# Patient Record
Sex: MALE | Race: OTHER | NOT HISPANIC OR LATINO | URBAN - METROPOLITAN AREA
[De-identification: names, ages, dates, MRNs, and addresses within clinical notes are randomized per-mention and may not be internally consistent; named-entity substitution may affect disease eponyms.]

---

## 2019-07-26 ENCOUNTER — INPATIENT (INPATIENT)
Facility: HOSPITAL | Age: 44
LOS: 6 days | Discharge: HOME HEALTH | DRG: 231 | End: 2019-08-02
Attending: THORACIC SURGERY (CARDIOTHORACIC VASCULAR SURGERY) | Admitting: THORACIC SURGERY (CARDIOTHORACIC VASCULAR SURGERY)
Payer: SELF-PAY

## 2019-07-26 VITALS
HEIGHT: 66 IN | SYSTOLIC BLOOD PRESSURE: 135 MMHG | DIASTOLIC BLOOD PRESSURE: 87 MMHG | WEIGHT: 242.07 LBS | TEMPERATURE: 98 F | RESPIRATION RATE: 20 BRPM | HEART RATE: 60 BPM | OXYGEN SATURATION: 100 %

## 2019-07-26 DIAGNOSIS — R63.8 OTHER SYMPTOMS AND SIGNS CONCERNING FOOD AND FLUID INTAKE: ICD-10-CM

## 2019-07-26 DIAGNOSIS — I21.3 ST ELEVATION (STEMI) MYOCARDIAL INFARCTION OF UNSPECIFIED SITE: ICD-10-CM

## 2019-07-26 DIAGNOSIS — Z29.9 ENCOUNTER FOR PROPHYLACTIC MEASURES, UNSPECIFIED: ICD-10-CM

## 2019-07-26 PROBLEM — Z00.00 ENCOUNTER FOR PREVENTIVE HEALTH EXAMINATION: Status: ACTIVE | Noted: 2019-07-26

## 2019-07-26 LAB
ANION GAP SERPL CALC-SCNC: 12 MMOL/L — SIGNIFICANT CHANGE UP (ref 5–17)
APPEARANCE UR: CLEAR — SIGNIFICANT CHANGE UP
APTT BLD: 108 SEC — HIGH (ref 27.5–36.3)
APTT BLD: 28.2 SEC — SIGNIFICANT CHANGE UP (ref 27.5–36.3)
BILIRUB UR-MCNC: NEGATIVE — SIGNIFICANT CHANGE UP
BLD GP AB SCN SERPL QL: NEGATIVE — SIGNIFICANT CHANGE UP
BUN SERPL-MCNC: 10 MG/DL — SIGNIFICANT CHANGE UP (ref 7–23)
CALCIUM SERPL-MCNC: 9.2 MG/DL — SIGNIFICANT CHANGE UP (ref 8.4–10.5)
CHLORIDE SERPL-SCNC: 102 MMOL/L — SIGNIFICANT CHANGE UP (ref 96–108)
CHOLEST SERPL-MCNC: 264 MG/DL — HIGH (ref 10–199)
CK MB CFR SERPL CALC: 255.2 NG/ML — HIGH (ref 0–6.7)
CK MB CFR SERPL CALC: 279 NG/ML — HIGH (ref 0–6.7)
CK SERPL-CCNC: 3218 U/L — HIGH (ref 30–200)
CK SERPL-CCNC: 3734 U/L — HIGH (ref 30–200)
CO2 SERPL-SCNC: 25 MMOL/L — SIGNIFICANT CHANGE UP (ref 22–31)
COLOR SPEC: YELLOW — SIGNIFICANT CHANGE UP
CREAT SERPL-MCNC: 0.91 MG/DL — SIGNIFICANT CHANGE UP (ref 0.5–1.3)
DIFF PNL FLD: ABNORMAL
GLUCOSE SERPL-MCNC: 112 MG/DL — HIGH (ref 70–99)
GLUCOSE UR QL: NEGATIVE — SIGNIFICANT CHANGE UP
HBA1C BLD-MCNC: 5.6 % — SIGNIFICANT CHANGE UP (ref 4–5.6)
HCT VFR BLD CALC: 43.8 % — SIGNIFICANT CHANGE UP (ref 39–50)
HDLC SERPL-MCNC: 43 MG/DL — SIGNIFICANT CHANGE UP
HGB BLD-MCNC: 14.6 G/DL — SIGNIFICANT CHANGE UP (ref 13–17)
INR BLD: 1.08 — SIGNIFICANT CHANGE UP (ref 0.88–1.16)
KETONES UR-MCNC: NEGATIVE — SIGNIFICANT CHANGE UP
LEUKOCYTE ESTERASE UR-ACNC: NEGATIVE — SIGNIFICANT CHANGE UP
LIPID PNL WITH DIRECT LDL SERPL: 199 MG/DL — HIGH
MCHC RBC-ENTMCNC: 29.5 PG — SIGNIFICANT CHANGE UP (ref 27–34)
MCHC RBC-ENTMCNC: 33.3 GM/DL — SIGNIFICANT CHANGE UP (ref 32–36)
MCV RBC AUTO: 88.5 FL — SIGNIFICANT CHANGE UP (ref 80–100)
NITRITE UR-MCNC: NEGATIVE — SIGNIFICANT CHANGE UP
NRBC # BLD: 0 /100 WBCS — SIGNIFICANT CHANGE UP (ref 0–0)
PH UR: 7 — SIGNIFICANT CHANGE UP (ref 5–8)
PLATELET # BLD AUTO: 207 K/UL — SIGNIFICANT CHANGE UP (ref 150–400)
POTASSIUM SERPL-MCNC: 3.9 MMOL/L — SIGNIFICANT CHANGE UP (ref 3.5–5.3)
POTASSIUM SERPL-SCNC: 3.9 MMOL/L — SIGNIFICANT CHANGE UP (ref 3.5–5.3)
PROT UR-MCNC: NEGATIVE MG/DL — SIGNIFICANT CHANGE UP
PROTHROM AB SERPL-ACNC: 12.2 SEC — SIGNIFICANT CHANGE UP (ref 10–12.9)
RBC # BLD: 4.95 M/UL — SIGNIFICANT CHANGE UP (ref 4.2–5.8)
RBC # FLD: 11.3 % — SIGNIFICANT CHANGE UP (ref 10.3–14.5)
RH IG SCN BLD-IMP: POSITIVE — SIGNIFICANT CHANGE UP
SODIUM SERPL-SCNC: 139 MMOL/L — SIGNIFICANT CHANGE UP (ref 135–145)
SP GR SPEC: 1.01 — SIGNIFICANT CHANGE UP (ref 1–1.03)
TOTAL CHOLESTEROL/HDL RATIO MEASUREMENT: 6.1 RATIO — SIGNIFICANT CHANGE UP (ref 3.4–9.6)
TRIGL SERPL-MCNC: 110 MG/DL — SIGNIFICANT CHANGE UP (ref 10–149)
TROPONIN T SERPL-MCNC: 2.4 NG/ML — CRITICAL HIGH (ref 0–0.01)
TROPONIN T SERPL-MCNC: 6.31 NG/ML — CRITICAL HIGH (ref 0–0.01)
TSH SERPL-MCNC: 1.2 UIU/ML — SIGNIFICANT CHANGE UP (ref 0.35–4.94)
UROBILINOGEN FLD QL: 0.2 E.U./DL — SIGNIFICANT CHANGE UP
WBC # BLD: 12.27 K/UL — HIGH (ref 3.8–10.5)
WBC # FLD AUTO: 12.27 K/UL — HIGH (ref 3.8–10.5)

## 2019-07-26 PROCEDURE — 99291 CRITICAL CARE FIRST HOUR: CPT

## 2019-07-26 PROCEDURE — 93306 TTE W/DOPPLER COMPLETE: CPT | Mod: 26

## 2019-07-26 PROCEDURE — 99292 CRITICAL CARE ADDL 30 MIN: CPT

## 2019-07-26 PROCEDURE — 93010 ELECTROCARDIOGRAM REPORT: CPT

## 2019-07-26 PROCEDURE — 93458 L HRT ARTERY/VENTRICLE ANGIO: CPT | Mod: 26,59

## 2019-07-26 PROCEDURE — 99223 1ST HOSP IP/OBS HIGH 75: CPT | Mod: 57

## 2019-07-26 PROCEDURE — 92928 PRQ TCAT PLMT NTRAC ST 1 LES: CPT | Mod: RC

## 2019-07-26 PROCEDURE — 71045 X-RAY EXAM CHEST 1 VIEW: CPT | Mod: 26

## 2019-07-26 RX ORDER — TICAGRELOR 90 MG/1
180 TABLET ORAL ONCE
Refills: 0 | Status: COMPLETED | OUTPATIENT
Start: 2019-07-26 | End: 2019-07-26

## 2019-07-26 RX ORDER — HEPARIN SODIUM 5000 [USP'U]/ML
INJECTION INTRAVENOUS; SUBCUTANEOUS
Qty: 25000 | Refills: 0 | Status: DISCONTINUED | OUTPATIENT
Start: 2019-07-26 | End: 2019-07-26

## 2019-07-26 RX ORDER — TICAGRELOR 90 MG/1
90 TABLET ORAL EVERY 12 HOURS
Refills: 0 | Status: DISCONTINUED | OUTPATIENT
Start: 2019-07-26 | End: 2019-07-26

## 2019-07-26 RX ORDER — SODIUM CHLORIDE 9 MG/ML
1000 INJECTION INTRAMUSCULAR; INTRAVENOUS; SUBCUTANEOUS ONCE
Refills: 0 | Status: COMPLETED | OUTPATIENT
Start: 2019-07-26 | End: 2019-07-26

## 2019-07-26 RX ORDER — METOPROLOL TARTRATE 50 MG
12.5 TABLET ORAL EVERY 12 HOURS
Refills: 0 | Status: DISCONTINUED | OUTPATIENT
Start: 2019-07-26 | End: 2019-07-30

## 2019-07-26 RX ORDER — ASPIRIN/CALCIUM CARB/MAGNESIUM 324 MG
81 TABLET ORAL EVERY 24 HOURS
Refills: 0 | Status: DISCONTINUED | OUTPATIENT
Start: 2019-07-27 | End: 2019-07-30

## 2019-07-26 RX ORDER — NITROGLYCERIN 6.5 MG
0.4 CAPSULE, EXTENDED RELEASE ORAL ONCE
Refills: 0 | Status: COMPLETED | OUTPATIENT
Start: 2019-07-26 | End: 2019-07-26

## 2019-07-26 RX ORDER — HEPARIN SODIUM 5000 [USP'U]/ML
1000 INJECTION INTRAVENOUS; SUBCUTANEOUS
Qty: 25000 | Refills: 0 | Status: DISCONTINUED | OUTPATIENT
Start: 2019-07-26 | End: 2019-07-27

## 2019-07-26 RX ORDER — CLOPIDOGREL BISULFATE 75 MG/1
75 TABLET, FILM COATED ORAL DAILY
Refills: 0 | Status: DISCONTINUED | OUTPATIENT
Start: 2019-07-27 | End: 2019-07-28

## 2019-07-26 RX ORDER — ATORVASTATIN CALCIUM 80 MG/1
80 TABLET, FILM COATED ORAL AT BEDTIME
Refills: 0 | Status: DISCONTINUED | OUTPATIENT
Start: 2019-07-26 | End: 2019-07-30

## 2019-07-26 RX ORDER — HEPARIN SODIUM 5000 [USP'U]/ML
4000 INJECTION INTRAVENOUS; SUBCUTANEOUS ONCE
Refills: 0 | Status: COMPLETED | OUTPATIENT
Start: 2019-07-26 | End: 2019-07-26

## 2019-07-26 RX ORDER — HEPARIN SODIUM 5000 [USP'U]/ML
4000 INJECTION INTRAVENOUS; SUBCUTANEOUS EVERY 6 HOURS
Refills: 0 | Status: DISCONTINUED | OUTPATIENT
Start: 2019-07-26 | End: 2019-07-26

## 2019-07-26 RX ORDER — CHLORHEXIDINE GLUCONATE 213 G/1000ML
1 SOLUTION TOPICAL
Refills: 0 | Status: DISCONTINUED | OUTPATIENT
Start: 2019-07-26 | End: 2019-07-30

## 2019-07-26 RX ADMIN — Medication 12.5 MILLIGRAM(S): at 22:01

## 2019-07-26 RX ADMIN — ATORVASTATIN CALCIUM 80 MILLIGRAM(S): 80 TABLET, FILM COATED ORAL at 22:01

## 2019-07-26 RX ADMIN — HEPARIN SODIUM 10 UNIT(S)/HR: 5000 INJECTION INTRAVENOUS; SUBCUTANEOUS at 20:21

## 2019-07-26 RX ADMIN — Medication 0.4 MILLIGRAM(S): at 09:53

## 2019-07-26 RX ADMIN — CHLORHEXIDINE GLUCONATE 1 APPLICATION(S): 213 SOLUTION TOPICAL at 18:07

## 2019-07-26 RX ADMIN — SODIUM CHLORIDE 1000 MILLILITER(S): 9 INJECTION INTRAMUSCULAR; INTRAVENOUS; SUBCUTANEOUS at 09:49

## 2019-07-26 RX ADMIN — TICAGRELOR 180 MILLIGRAM(S): 90 TABLET ORAL at 09:47

## 2019-07-26 RX ADMIN — HEPARIN SODIUM 4000 UNIT(S): 5000 INJECTION INTRAVENOUS; SUBCUTANEOUS at 09:53

## 2019-07-26 RX ADMIN — Medication 12.5 MILLIGRAM(S): at 12:57

## 2019-07-26 RX ADMIN — CHLORHEXIDINE GLUCONATE 1 APPLICATION(S): 213 SOLUTION TOPICAL at 10:37

## 2019-07-26 NOTE — ED PROVIDER NOTE - OBJECTIVE STATEMENT
Pt is a 43yo m, no known pmh, who p/w generalized chest tightness starting 8:50a today, a/w diaphoresis and sweating. Pt is a 43yo m, no known pmh, who p/w generalized chest tightness starting 8:50a today, a/w diaphoresis and sweating. Sx's worse with walking. No sob, palp, dizziness, syncope, radiation. Father w/ mi in his late 40's. Denies smoking. No recreational drug use.

## 2019-07-26 NOTE — PROGRESS NOTE ADULT - SUBJECTIVE AND OBJECTIVE BOX
PROCEDURE: CORONARY ANGIOGRAM, LVGRAM, LHC, PCI   INDICATION: STEMI  ATTENDING: DEON   ACCESS: RRA  D2B: 51 minutes    FINDINGS:   dLM= 70%  pLcx=60%   pLAD=95% at bifurcation with D1  D1= 95% ostial (Riley 1,1,1)  mLAD= 90%   mRCA= 100% (culprit)    LVEF=45% with inferior hypokinesis  LVEDP=33mmHg    PROCEDURE:   PCI of mRCA with JESSICA x1 (sarah 5.0 x22 post dilated to 6.0) with excellent angiographic result and restoration of CRISTIANA 3 flow    A/P  -CCU admit  -plavix 75 mg in am   -aspirin 81 mg daily   -echo  -CT surgery eval (Dr. Cooper for evaluation of CABG in the setting of severe LM, LAD and D1 disease)

## 2019-07-26 NOTE — ED PROVIDER NOTE - PHYSICAL EXAMINATION
VITAL SIGNS: I have reviewed nursing notes and confirm.  CONSTITUTIONAL: Well-developed; well-nourished; in no acute distress. Mildly diaphoresis.  SKIN:  warm and dry, no acute rash.   HEAD:  normocephalic, atraumatic.  EYES: EOM intact; conjunctiva and sclera clear.  ENT: No nasal discharge; airway clear.   NECK: Supple; non tender.  CARD: S1, S2 normal; no murmurs, gallops, or rubs. Regular rate and rhythm.   RESP:  Clear to auscultation b/l, no wheezes, rales or rhonchi.  ABD: Normal bowel sounds; soft; non-distended; non-tender; no guarding/ rebound.  EXT: Normal ROM. No clubbing, cyanosis or edema. 2+ pulses to b/l ue/le.  NEURO: Alert, oriented, grossly unremarkable  PSYCH: Cooperative, mood and affect appropriate.

## 2019-07-26 NOTE — CONSULT NOTE ADULT - SUBJECTIVE AND OBJECTIVE BOX
Surgeon: Dr. Cooper    Requesting Physician: Dr. Nathan Zarate    HISTORY OF PRESENT ILLNESS (Need 4):  44y Male    PAST MEDICAL & SURGICAL HISTORY:      MEDICATIONS  (STANDING):  atorvastatin 80 milliGRAM(s) Oral at bedtime  chlorhexidine 2% Cloths 1 Application(s) Topical <User Schedule>    Allergies:  No Known Allergies    SOCIAL HISTORY:  Smoker:  YES / NO        PACK YEARS:                         WHEN QUIT?  ETOH use:  YES / NO               FREQUENCY / QUANTITY:  Ilicit Drug use:  YES / NO  Occupation:  Assisted device use (Cane / Walker):  Live with:    FAMILY HISTORY:    Review of Systems:  CONSTITUTIONAL: Denies fevers / chills, sweats, fatigue, weight loss, weight gain                                       NEURO:  Denies parathesias, seizures, syncope, confusion                                                                                  EYES:  Denies blurry vision, discharge, pain, loss of vision                                                                                    ENMT:  Denies difficulty hearing, vertigo, dysphagia, epistaxis, recent dental work                                       CV:  Denies chest pain, palpitations, ELIAS, orthopnea                                                                                           RESPIRATORY:  Denies wWheezing, SOB, cough / sputum, hemoptysis                                                               GI:  Denies nausea, vomiting, diarrhea, constipation, melena                                                                          : Denies hematuria, dysuria, urgency, incontinence                                                                                          MUSKULOSKELETAL:  Denies arthritis, joint swelling, muscle weakness                                                             SKIN/BREAST:  Denies rash, itching, hair loss, masses                                                                                              PSYCH:  Denies depression, anxiety, suicidal ideation                                                                                                HEME/LYMPH:  Denies bruises easily, enlarged lymph nodes, tender lymph nodes                                          ENDOCRINE:  Denies cold intolerance, heat intolerance, polydipsia                                                                      Vital Signs Last 24 Hrs  T(C): 36.6 (26 Jul 2019 09:34), Max: 36.6 (26 Jul 2019 09:34)  T(F): 97.9 (26 Jul 2019 09:34), Max: 97.9 (26 Jul 2019 09:34)  HR: 73 (26 Jul 2019 09:53) (60 - 73)  BP: 136/99 (26 Jul 2019 09:53) (135/87 - 136/99)  BP(mean): --  RR: 22 (26 Jul 2019 09:53) (20 - 22)  SpO2: 95% (26 Jul 2019 09:53) (95% - 100%)    Physical Exam   CONSTITUTIONAL:                                                      NEURO:                                                                                                 EYES:                                                                        ENMT:                                                                                CV:                                                                                   RESPIRATORY:                                                                GI:                                                                                   :                                                    MUSKULOSKELETAL:                                                       SKIN / BREAST:                                                                                                     LABS:                        14.6   9.64  )-----------( 240      ( 26 Jul 2019 09:52 )             43.4     07-26  142  |  105  |  10  ----------------------------<  151  4.0   |  27  |  0.96    Ca    9.1      26 Jul 2019 09:52    TPro  7.2  /  Alb  4.5  /  TBili  0.3  /  DBili  x   /  AST  21  /  ALT  25  /  AlkPhos  91  07-26    PT/INR - ( 26 Jul 2019 09:52 )   PT: 11.3 sec;   INR: 1.00     PTT - ( 26 Jul 2019 09:52 )  PTT:29.0 sec    CARDIAC MARKERS ( 26 Jul 2019 09:52 )  x     / <0.01 ng/mL / 255 U/L / x     / 4.5 ng/mL    RADIOLOGY & ADDITIONAL STUDIES:  CAROTID U/S:    CXR:    CT Scan:    EKG:    TTE / REGIS:    Cardiac Cath: Surgeon: Dr. Cooper    Requesting Physician: Dr. Nathan Zarate    HISTORY OF PRESENT ILLNESS:  44y Male with no significant PMHx who presented to the ED this AM with 2 day history of CP and tightness.  He endorses that he was at work this AM and had an acute worsening in the chest pressure.  He became diaphoretic and clammy.  The chest pressure was so intense that he vomited.  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  In the ED, he was found to have EKG changes in the inferior leads with reciprocal changes in the anterior and lateral leads.  He was loaded with ASA/brillinta and sent to cath labs.   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  CT surgery is consulted for surgical evaluation.    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  Assisted device use (Cane / Walker): None  Live with: Parents    FAMILY HISTORY:  Father:  MI in 50's requiring CABG/PPM    Review of Systems:  CONSTITUTIONAL: + sweats Denies fevers / chills, fatigue, weight loss, weight gain                                       NEURO:  Denies parathesias, seizures, syncope, confusion                                                                                  EYES:  Denies blurry vision, discharge, pain, loss of vision                                                                                    ENMT:  Denies difficulty hearing, vertigo, dysphagia, epistaxis, recent dental work                                       CV: + chest pressure Denies  palpitations, ELIAS, orthopnea                                                                                           RESPIRATORY:  Denies wheezing, SOB, cough / sputum, hemoptysis                                                               GI:  + vomiting Denies nausea, diarrhea, constipation, melena                                                                          : Denies hematuria, dysuria, urgency, incontinence                                                                                          MUSKULOSKELETAL:  Denies arthritis, joint swelling, muscle weakness                                                             SKIN/BREAST:  Denies rash, itching, hair loss, masses                                                                                              PSYCH:  Denies depression, anxiety, suicidal ideation                                                                                                HEME/LYMPH:  Denies bruises easily, enlarged lymph nodes, tender lymph nodes                                          ENDOCRINE:  Denies cold intolerance, heat intolerance, polydipsia                                                                      Vital Signs Last 24 Hrs  T(C): 36.6 (26 Jul 2019 09:34), Max: 36.6 (26 Jul 2019 09:34)  T(F): 97.9 (26 Jul 2019 09:34), Max: 97.9 (26 Jul 2019 09:34)  HR: 73 (26 Jul 2019 09:53) (60 - 73)  BP: 136/99 (26 Jul 2019 09:53) (135/87 - 136/99)  BP(mean): --  RR: 22 (26 Jul 2019 09:53) (20 - 22)  SpO2: 95% (26 Jul 2019 09:53) (95% - 100%)    Physical Exam   CONSTITUTIONAL:  WDWN in NAD                                               NEURO: AA&Ox3, no focal deficits                                                                                           EYES: PERRL                                                                        ENMT: supple, no JVD                                                                       CV:  S1S2, no murmurs appreciated                                                        RESPIRATORY: b/l breath sounds                                 GI: soft, NTND +NABS                                       :  WNL                         MUSKULOSKELETAL: moves all 4  VASCULAR:  WWP, rigth radial with band, left radial 2+, b/l LE DP/PT 2+                                                     SKIN / BREAST:  WNL                                                          LABS:                        14.6   9.64  )-----------( 240      ( 26 Jul 2019 09:52 )             43.4     07-26  142  |  105  |  10  ----------------------------<  151  4.0   |  27  |  0.96    Ca    9.1      26 Jul 2019 09:52    TPro  7.2  /  Alb  4.5  /  TBili  0.3  /  DBili  x   /  AST  21  /  ALT  25  /  AlkPhos  91  07-26    PT/INR - ( 26 Jul 2019 09:52 )   PT: 11.3 sec;   INR: 1.00     PTT - ( 26 Jul 2019 09:52 )  PTT:29.0 sec    CARDIAC MARKERS ( 26 Jul 2019 09:52 )  x     / <0.01 ng/mL / 255 U/L / x     / 4.5 ng/mL    RADIOLOGY & ADDITIONAL STUDIES:  CAROTID U/S: pending    CXR: pending    EKG: in chart    TTE / REGIS: pending    Cardiac Cath:  FINDINGS:   dLM= 70%  pLcx=60%   pLAD=95% at bifurcation with D1  D1= 95% ostial  mLAD= 90%   mRCA= 100% (culprit)    LVEF=45% with inferior hypokinesis  LVEDP=33mmHg    PROCEDURE:   PCI of mRCA with JESSICA x1 (sarah 5.0 x22 post dilated to 6.0) with excellent angiographic result and restoration of CRISTIANA 3 flow

## 2019-07-26 NOTE — CONSULT NOTE ADULT - ASSESSMENT
44y Male with no significant PMHx who presented to the ED this AM with 2 day history of CP and tightness.  He endorses that he was at work this AM and had an acute worsening in the chest pressure.  He became diaphoretic and clammy.  The chest pressure was so intense that he vomited.  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  In the ED, he was found to have EKG changes in the inferior leads with reciprocal changes in the anterior and lateral leads.  He was loaded with ASA/brillinta and sent to cath labs.   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  CT surgery is consulted for surgical evaluation.    Neuro: no pain at this time  - if chest pain to return, consider IABP or surgical intervention    CV:  3VCAD  -  s/p cardiac cath with JESSICA to mRCA (ASA/brillinta load)  -  cont. with ASA/plavix  -  no beta blocker in light of HR in low 60's  -  statin:  atorvastatin   -  pre-op patient for OR on Monday: ECHO, carotid US, CXR, type/screen x2, place blood on hold, f/u pre op labs    Pulm: 98% on RA  -  obtain PFTs  -  encourage IS 10/hr while awake  -  obtain room air ABG once radial band off on right arm    GI: DASH diet  -  start Ppx with pantoprazole  -  NPO on Sunday for OR Monday    Renal:  -  strict I/O's   -  monitor BUN/Crt. in light of dye load    Heme:  -  HSQ q8    Endo:  -  f/u TSH and HgA1C    Dispo:   -  OR on Monday with Dr. Cooper 44y Male with no significant PMHx who presented to the ED this AM with 2 day history of CP and tightness.  He endorses that he was at work this AM and had an acute worsening in the chest pressure.  He became diaphoretic and clammy.  The chest pressure was so intense that he vomited.  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  In the ED, he was found to have EKG changes in the inferior leads with reciprocal changes in the anterior and lateral leads.  He was loaded with ASA/brillinta and sent to cath labs.   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  CT surgery is consulted for surgical evaluation.    Neuro: no pain at this time  - if chest pain to return, consider IABP or surgical intervention    CV:  3VCAD  -  s/p cardiac cath with JESSICA to mRCA (ASA/brillinta load)  -  cont. with ASA/plavix  -  please start beta blocker on patient  -  statin:  atorvastatin   -  pre-op patient for OR on Monday: ECHO, carotid US, CXR, type/screen x2, place blood on hold, f/u pre op labs    Pulm: 98% on RA  -  obtain PFTs  -  encourage IS 10/hr while awake  -  obtain room air ABG once radial band off on right arm    GI: DASH diet  -  start Ppx with pantoprazole  -  NPO on Sunday for OR Monday    Renal:  -  strict I/O's   -  monitor BUN/Crt. in light of dye load    Heme:  -  HSQ q8    Endo:  -  f/u TSH and HgA1C    Dispo:   -  OR on Monday with Dr. Cooper

## 2019-07-26 NOTE — H&P ADULT - ASSESSMENT
43yo M with no PMHx (Fhx of father with ACS s/p CABG age ~44yo) presented to the ED with crushing chest pain and left arm pain. Found to have inferior wall STEMI s/p JESSICA to mid-RCA, now pending CABG on Monday for left main disease. 45yo M with no PMHx (Fhx of father with ACS s/p CABG age ~46yo) presented to the ED with crushing chest pain and left arm pain. Found to have inferior/posterior wall STEMI s/p JESSICA to mid-RCA, now pending CABG on Monday for left main disease.

## 2019-07-26 NOTE — H&P ADULT - HISTORY OF PRESENT ILLNESS
This is a     On ED arrival, his vitals were: Temp: 97.9, HR:60, BP: 135/87, RR:20 SpO2 100% on RA. His troponins and CKMB were negative and CK was mildly elevated to 255. His EKG significant for inferior wall STEMI with reciprocal changes in lateral and anterior leads.     He was aspirin, Brilinta loaded at 9: 45 AM in the ED, given nitroglycerin x1, heparin bolused and started on hep gtt and sent to cath lab. 45yo M with no PMHx (Fhx of father with ACS s/p CABG age ~46yo) presented to the ED with crushing chest pain and left arm pain after co-worker (a pharmacist) found him to be diaphoretic and pale. Patient reports first feeling the chest tightness on Wednesday. However, today on the way to work he reports the pain being worse w/ associated left arm pain and [place]aph[place] work and his co-worker gave him 4x*81mg ASA and told him to go to the ED.     On ED arrival, his vitals were: Temp: 97.9, HR:60, BP: 135/87, RR:20 SpO2 100% on RA. His troponins and CKMB were negative and CK was mildly elevated to 255. His EKG significant for inferior wall STEMI with reciprocal changes in lateral and anterior leads.     He was aspirin, Brilinta loaded at 9: 45 AM in the ED, given nitroglycerin x1, heparin bolused and started on hep gtt and sent to cath lab. He received a JESSICA in the mid-RCA and also found to have left main and LAD lesions. 45yo M with no PMHx (Fhx of father with ACS s/p CABG age ~46yo) presented to the ED with crushing chest pain and left arm pain after co-worker (a pharmacist) found him to be diaphoretic and pale. Patient reports first feeling the chest tightness on Wednesday. However, today on the way to work he reports the pain being worse w/ associated left arm pain and [place]aph[place] He [place] at work and his co-worker gave him 4x 81mg ASA and told him to go to the ED.     On ED arrival, his vitals were: Temp: 97.9, HR:60, BP: 135/87, RR:20 SpO2 100% on RA. His troponins and CKMB were negative and CK was mildly elevated to 255. His EKG significant for inferior wall STEMI with reciprocal changes in lateral and anterior leads.     He was aspirin, Brilinta loaded at 9: 45 AM in the ED, given nitroglycerin x1, heparin bolused and started on hep gtt and sent to cath lab. He received a JESSICA in the mid-RCA and also found to have left main and LAD lesions. 45yo M with no PMHx (Fhx of father with ACS s/p CABG age ~46yo) presented to the ED with crushing chest pain and left arm pain after co-worker (a pharmacist) found him to be diaphoretic and pale. Patient reports first feeling the chest tightness on Wednesday. However, today on the way to work he reports the pain being worse w/ associated left arm pain and [place] He [place] at work and his co-worker gave him 4x 81mg ASA and told him to go to the ED.     On ED arrival, his vitals were: Temp: 97.9, HR:60, BP: 135/87, RR:20 SpO2 100% on RA. His troponins and CKMB were negative and CK was mildly elevated to 255. His EKG significant for inferior/posterior wall STEMI with reciprocal changes in lateral and anterior leads.     He was aspirin, Brilinta loaded at 9: 45 AM in the ED, given nitroglycerin x1, heparin bolused and started on hep gtt and sent to cath lab. He received a JESSICA in the mid-RCA and also found to have left main and LAD lesions.

## 2019-07-26 NOTE — ED ADULT NURSE NOTE - OBJECTIVE STATEMENT
Pt presents to ED c/o CP. Pt with no PMH presents with 3 days of L sided CP, intermittent, most recently started 830 am when pt got to work, 7/10, was given 4x81 mg ASA by a coworker which did not help pain, pt came to ED. On arrival pt pale and diaphoretic, EKG preformed in triage, pt upgraded to MD Gardner and brought to bed 6. 2nd EKG preformed, cath lab code called by MD Gardner. Pt connected to continuous portable monitor, pt undressed and belongings and valuables checked by security. See eMAR for medications administered. Pt transported to cath lab with cards fellow accompanying and brought to cath lab room E, hand off given to cath lab RNs.

## 2019-07-26 NOTE — H&P ADULT - PROBLEM SELECTOR PLAN 3
DVT prophylaxis: Lovenox   Code status: Full code   Dispo: CCU DVT prophylaxis: heparin ggt  Code status: Full code   Dispo: CCU

## 2019-07-26 NOTE — H&P ADULT - ATTENDING COMMENTS
Pt is a 45 y/o man c strong family h/o CAD (father c CABG @ 45, uncle  of MI @ age 28) who p/w 3 day h/o stuttering chest pain that worsened today. Pt arrived at work nauseous, diaphoretic and light headed with SSCPressure.    ED ECG: sb @ 57 c 3mm WIL II, III, AVF, depressions V1-V2, I & AVL  Emergent Cath: 100% RCA now s/p PCI  dLM 90-95%, 99% trifurcation lesion LAD/D1/LCX  V-gram: LVEDP 35, EF ~ 30-35%    afebrile, 135/87, HR 75, 100% RA    Hgb 13.6  Plt 240  Cr 0.96  Glc 151  Joseph -ve on admission  INR 1    CXR: clr wnl    - pt with inf post stemi  - plan is for CABG on Monday for dLM and trifurcation lesion  - cont asa, pt loaded on brilinta  - switch to plavix in am and cont pt on hep  - pt will get CABG on plavix  - start lipitor 80mg  - would cont to observe for pulm edema as pt in acute sytolic CHF c LVEDP ~35, EF 35%  - start low dose B-blocker  - would hold ACE for now, would start immediately postoperatively  - check lipid panel, HgbA1C, and TTE    I have personally provided 70 minutes of critical care time concurrently with the resident/fellow and excludes time spent on separate procedures.   I have reviewed the resident's documentation and I agree with the resident's assessment and plan of care.

## 2019-07-26 NOTE — H&P ADULT - PROBLEM SELECTOR PLAN 1
- Pt is s/p aspirin and Brilinta load at 9:45 AM   - Continue aspirin 81mg daily starting tomorrow   - Continue Brilinta 90mg BID starting tonight   - Continue lipitor 80mg at bedtime  - follow up Lipid panel, TSH and HbA1c  - Official Echocardiogram Patient presented with chest pain found to have inferior wall MI. Now s/p JESSICA to mid-RCA. 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 Plan for CABG on monday.   - Pt is s/p aspirin and Brilinta load at 9:45 AM, instead of Brilinta will switch over to Plavix as he is going for CABG.   - Continue aspirin 81mg daily starting tomorrow   - Continue Plavix 75mg qd starting tomorrow  - Continue lipitor 80mg at bedtime  - Will start heparin ggt after radial band removed  - follow up Lipid panel, TSH and HbA1c  - Official Echocardiogram pending  - Continue to trend troponins Patient presented with chest pain found to have inferior wall MI. Now s/p JESSICA to mid-RCA. 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 Plan for CABG on monday.   - Pt is s/p aspirin and Brilinta load at 9:45 AM, instead of Brilinta will switch over to Plavix as he is going for CABG.   - Continue aspirin 81mg daily starting tomorrow   - Continue Plavix 75mg qd starting tomorrow  - Continue lipitor 80mg at bedtime  - Continue lopressor 12.5 BID  - Consider lisinopril should BPs allow  - Will start heparin ggt after radial band removed  - follow up Lipid panel, TSH and HbA1c  - Official Echocardiogram pending  - Continue to trend troponins

## 2019-07-26 NOTE — H&P ADULT - NSHPPHYSICALEXAM_GEN_ALL_CORE
VITAL SIGNS:  T(C): 36.6 (07-26-19 @ 09:34), Max: 36.6 (07-26-19 @ 09:34)  T(F): 97.9 (07-26-19 @ 09:34), Max: 97.9 (07-26-19 @ 09:34)  HR: 73 (07-26-19 @ 09:53) (60 - 73)  BP: 136/99 (07-26-19 @ 09:53) (135/87 - 136/99)  BP(mean): --  RR: 22 (07-26-19 @ 09:53) (20 - 22)  SpO2: 95% (07-26-19 @ 09:53) (95% - 100%)  Wt(kg): --    PHYSICAL EXAM:    Constitutional: WDWN resting comfortably in bed; NAD  Head: NC/AT  Eyes: PERRL, EOMI, anicteric sclera  ENT: no nasal discharge; uvula midline, no oropharyngeal erythema or exudates; MMM  Neck: supple; no JVD or thyromegaly  Respiratory: CTA B/L; no W/R/R, no retractions  Cardiac: +S1/S2; RRR; no M/R/G; PMI non-displaced  Gastrointestinal: abdomen soft, NT/ND; no rebound or guarding; +BSx4  Extremities: Right radial arm in place, WWP, no clubbing or cyanosis; no peripheral edema  Musculoskeletal: NROM x4; no joint swelling, tenderness or erythema  Vascular: 2+ radial, femoral, DP/PT pulses B/L  Dermatologic: skin warm, dry and intact;  Lymphatic: no LAD  Neurologic: AAOx3; CNII-XII grossly intact; no focal deficits  Psychiatric: affect and characteristics of appearance, verbalizations, behaviors are appropriate

## 2019-07-26 NOTE — ED PROVIDER NOTE - CLINICAL SUMMARY MEDICAL DECISION MAKING FREE TEXT BOX
Impression: acute inferior wall MI with ekg showing st elev to inferior wall w/ reciprocal depressions to lateral leads, v1, v2. HDS. STEMI code activated immediately after arrival. Pt given brilinta, ntg, and heparin bolus. Pt already took 4 baby asa pta. Pt seen by cards fellow; pt to be taken to cath lab now.

## 2019-07-26 NOTE — H&P ADULT - NSHPLABSRESULTS_GEN_ALL_CORE
.  LABS:                         14.6   9.64  )-----------( 240      ( 26 Jul 2019 09:52 )             43.4     07-26    142  |  105  |  10  ----------------------------<  151<H>  4.0   |  27  |  0.96    Ca    9.1      26 Jul 2019 09:52    TPro  7.2  /  Alb  4.5  /  TBili  0.3  /  DBili  x   /  AST  21  /  ALT  25  /  AlkPhos  91  07-26    PT/INR - ( 26 Jul 2019 09:52 )   PT: 11.3 sec;   INR: 1.00          PTT - ( 26 Jul 2019 09:52 )  PTT:29.0 sec    CARDIAC MARKERS ( 26 Jul 2019 09:52 )  x     / <0.01 ng/mL / 255 U/L / x     / 4.5 ng/mL            RADIOLOGY, EKG & ADDITIONAL TESTS: Reviewed.

## 2019-07-27 LAB
ANION GAP SERPL CALC-SCNC: 14 MMOL/L — SIGNIFICANT CHANGE UP (ref 5–17)
APTT BLD: 55.2 SEC — HIGH (ref 27.5–36.3)
APTT BLD: 56.6 SEC — HIGH (ref 27.5–36.3)
APTT BLD: 60.9 SEC — HIGH (ref 27.5–36.3)
BUN SERPL-MCNC: 12 MG/DL — SIGNIFICANT CHANGE UP (ref 7–23)
CALCIUM SERPL-MCNC: 9.5 MG/DL — SIGNIFICANT CHANGE UP (ref 8.4–10.5)
CHLORIDE SERPL-SCNC: 99 MMOL/L — SIGNIFICANT CHANGE UP (ref 96–108)
CK MB CFR SERPL CALC: 154.1 NG/ML — HIGH (ref 0–6.7)
CK MB CFR SERPL CALC: 225.4 NG/ML — HIGH (ref 0–6.7)
CK SERPL-CCNC: 2927 U/L — HIGH (ref 30–200)
CK SERPL-CCNC: 3712 U/L — HIGH (ref 30–200)
CO2 SERPL-SCNC: 24 MMOL/L — SIGNIFICANT CHANGE UP (ref 22–31)
CREAT SERPL-MCNC: 0.98 MG/DL — SIGNIFICANT CHANGE UP (ref 0.5–1.3)
GLUCOSE SERPL-MCNC: 128 MG/DL — HIGH (ref 70–99)
HCT VFR BLD CALC: 41.8 % — SIGNIFICANT CHANGE UP (ref 39–50)
HGB BLD-MCNC: 14.4 G/DL — SIGNIFICANT CHANGE UP (ref 13–17)
MAGNESIUM SERPL-MCNC: 1.9 MG/DL — SIGNIFICANT CHANGE UP (ref 1.6–2.6)
MCHC RBC-ENTMCNC: 30 PG — SIGNIFICANT CHANGE UP (ref 27–34)
MCHC RBC-ENTMCNC: 34.4 GM/DL — SIGNIFICANT CHANGE UP (ref 32–36)
MCV RBC AUTO: 87.1 FL — SIGNIFICANT CHANGE UP (ref 80–100)
NRBC # BLD: 0 /100 WBCS — SIGNIFICANT CHANGE UP (ref 0–0)
PLATELET # BLD AUTO: 207 K/UL — SIGNIFICANT CHANGE UP (ref 150–400)
POTASSIUM SERPL-MCNC: 3.8 MMOL/L — SIGNIFICANT CHANGE UP (ref 3.5–5.3)
POTASSIUM SERPL-SCNC: 3.8 MMOL/L — SIGNIFICANT CHANGE UP (ref 3.5–5.3)
RBC # BLD: 4.8 M/UL — SIGNIFICANT CHANGE UP (ref 4.2–5.8)
RBC # FLD: 11.5 % — SIGNIFICANT CHANGE UP (ref 10.3–14.5)
SODIUM SERPL-SCNC: 137 MMOL/L — SIGNIFICANT CHANGE UP (ref 135–145)
TROPONIN T SERPL-MCNC: 7.55 NG/ML — CRITICAL HIGH (ref 0–0.01)
TROPONIN T SERPL-MCNC: 8.22 NG/ML — CRITICAL HIGH (ref 0–0.01)
WBC # BLD: 11.91 K/UL — HIGH (ref 3.8–10.5)
WBC # FLD AUTO: 11.91 K/UL — HIGH (ref 3.8–10.5)

## 2019-07-27 PROCEDURE — 93880 EXTRACRANIAL BILAT STUDY: CPT | Mod: 26

## 2019-07-27 PROCEDURE — 99291 CRITICAL CARE FIRST HOUR: CPT

## 2019-07-27 PROCEDURE — 99232 SBSQ HOSP IP/OBS MODERATE 35: CPT | Mod: 25

## 2019-07-27 PROCEDURE — 71045 X-RAY EXAM CHEST 1 VIEW: CPT | Mod: 26

## 2019-07-27 PROCEDURE — 93010 ELECTROCARDIOGRAM REPORT: CPT

## 2019-07-27 RX ORDER — HEPARIN SODIUM 5000 [USP'U]/ML
1100 INJECTION INTRAVENOUS; SUBCUTANEOUS
Qty: 25000 | Refills: 0 | Status: DISCONTINUED | OUTPATIENT
Start: 2019-07-27 | End: 2019-07-28

## 2019-07-27 RX ORDER — HEPARIN SODIUM 5000 [USP'U]/ML
1100 INJECTION INTRAVENOUS; SUBCUTANEOUS
Qty: 25000 | Refills: 0 | Status: DISCONTINUED | OUTPATIENT
Start: 2019-07-27 | End: 2019-07-27

## 2019-07-27 RX ORDER — POTASSIUM CHLORIDE 20 MEQ
20 PACKET (EA) ORAL ONCE
Refills: 0 | Status: COMPLETED | OUTPATIENT
Start: 2019-07-27 | End: 2019-07-27

## 2019-07-27 RX ORDER — MAGNESIUM SULFATE 500 MG/ML
1 VIAL (ML) INJECTION ONCE
Refills: 0 | Status: COMPLETED | OUTPATIENT
Start: 2019-07-27 | End: 2019-07-27

## 2019-07-27 RX ADMIN — Medication 100 GRAM(S): at 07:41

## 2019-07-27 RX ADMIN — CLOPIDOGREL BISULFATE 75 MILLIGRAM(S): 75 TABLET, FILM COATED ORAL at 12:05

## 2019-07-27 RX ADMIN — HEPARIN SODIUM 11 UNIT(S)/HR: 5000 INJECTION INTRAVENOUS; SUBCUTANEOUS at 18:50

## 2019-07-27 RX ADMIN — ATORVASTATIN CALCIUM 80 MILLIGRAM(S): 80 TABLET, FILM COATED ORAL at 22:05

## 2019-07-27 RX ADMIN — Medication 81 MILLIGRAM(S): at 10:37

## 2019-07-27 RX ADMIN — Medication 12.5 MILLIGRAM(S): at 22:06

## 2019-07-27 RX ADMIN — Medication 12.5 MILLIGRAM(S): at 10:37

## 2019-07-27 RX ADMIN — HEPARIN SODIUM 11 UNIT(S)/HR: 5000 INJECTION INTRAVENOUS; SUBCUTANEOUS at 07:08

## 2019-07-27 RX ADMIN — Medication 20 MILLIEQUIVALENT(S): at 07:41

## 2019-07-27 NOTE — PROGRESS NOTE ADULT - SUBJECTIVE AND OBJECTIVE BOX
INTERVENTIONAL CARDIOLOGY FOLLOW UP NOTE    -Patient seen and examined this am  -No events overnight  -No complaints this am    T(C): 36.9 (07-27-19 @ 08:57), Max: 37.2 (07-26-19 @ 20:53)  HR: 74 (07-27-19 @ 10:00) (58 - 84)  BP: 114/77 (07-27-19 @ 10:00) (81/47 - 139/99)  RR: 21 (07-27-19 @ 10:00) (13 - 45)  SpO2: 99% (07-27-19 @ 10:00) (94% - 100%)    VASCULAR ACCESS EXAM:     RADIAL:   2+ right radial pulse   Normal capillary refill. No evidence of motor or sensory deficits of digits, hand, wrist or forearm.   -Access site clean, non-tender, without ecchymosis or hematoma.    A/P  S/p PCI via radial approach with no evidence of vascular complications post procedure.     -continue with current medications including dual antiplatelet therapy   -discharge instructions as per protocol   -outpatient follow up with primary cardiologist

## 2019-07-27 NOTE — PROGRESS NOTE ADULT - PROBLEM SELECTOR PLAN 1
Patient presented with chest pain found to have inferior wall MI. Now s/p JESSICA to mid-RCA. 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 Plan for CABG on monday.   - Pt is s/p aspirin and Brilinta load at 9:45 AM, instead of Brilinta will switch over to Plavix as he is going for CABG.   - Continue aspirin 81mg daily starting tomorrow   - Continue Plavix 75mg qd starting tomorrow  - Continue lipitor 80mg at bedtime  - Continue lopressor 12.5 BID  - Heparin ggt therapeutic, continue @ 11ml/hr  - After CABG can restart lisinopril should BPs allow  - Lipid panel (Cholesterol 264, , HDL 43, TGs 110), TSH (1.197) and HbA1c (5.6)  - Echo showed EF of 45-50%, Mild segmental left ventricular systolic dysfunction with akinesis of basal inferior and hypokinesis of the inferolateral wall.  - Troponin peaked at 8.22 > 7.55, no need to trend  - Close watch, should patient have chest pain, low threshold for ballon pump Patient presented with chest pain found to have inferior wall MI. Now s/p JESSICA to mid-RCA. 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 Plan for CABG on monday.   - Pt is s/p aspirin and Brilinta load at 9:45 AM, instead of Brilinta will switch over to Plavix as he is going for CABG.   - Continue aspirin 81mg daily starting tomorrow   - Continue Plavix 75mg qd starting tomorrow  - Continue lipitor 80mg at bedtime  - Continue lopressor 12.5 BID  - Heparin ggt therapeutic, continue @ 11ml/hr  - After CABG can restart lisinopril should BPs allow  - Lipid panel (Cholesterol 264, , HDL 43, TGs 110), TSH (1.197) and HbA1c (5.6)  - Echo showed EF of 45-50%, Mild segmental left ventricular systolic dysfunction with akinesis of basal inferior and hypokinesis of the inferolateral wall.  - Troponin peaked at 8.22 > 7.55, no need to trend  - Close watch, should patient have chest pain, low threshold for ballon pump  - f/u Carotid doppler  - f/u PFTs

## 2019-07-27 NOTE — PROGRESS NOTE ADULT - ASSESSMENT
45yo M with no PMHx (Fhx of father with ACS s/p CABG age ~46yo) presented to the ED with crushing chest pain and left arm pain. Found to have inferior/posterior wall STEMI s/p JESSICA to mid-RCA, now pending CABG on Monday for left main disease.

## 2019-07-27 NOTE — PROGRESS NOTE ADULT - SUBJECTIVE AND OBJECTIVE BOX
**Incomplete Note**     INTERVAL HPI/OVERNIGHT EVENTS:    SUBJECTIVE: Patient seen and examined at bedside.    OBJECTIVE:    VITAL SIGNS:  ICU Vital Signs Last 24 Hrs  T(C): 36.7 (2019 04:00), Max: 37.2 (2019 20:53)  T(F): 98 (2019 04:00), Max: 98.9 (2019 20:53)  HR: 80 (2019 06:00) (58 - 84)  BP: 90/71 (2019 06:00) (81/65 - 139/99)  BP(mean): 82 (2019 06:00) (68 - 117)  ABP: --  ABP(mean): --  RR: 29 (2019 06:00) (13 - 45)  SpO2: 98% (2019 06:00) (95% - 100%)        07- @ 07:01  -  - @ 06:48  --------------------------------------------------------  IN: 510 mL / OUT: 3600 mL / NET: -3090 mL      CAPILLARY BLOOD GLUCOSE          PHYSICAL EXAM:    Constitutional: WDWN resting comfortably in bed; NAD  HEENT: NC/AT; PERRL, anicteric sclera; MMM  Neck: supple, no JVD  Cardiovascular: +S1/S2; RRR; no M/R/G  Respiratory: CTA B/L; no W/R/R; respirations appear non-labored  Gastrointestinal: abdomen soft, NT/ND; +BS x4  Extremities: WWP; no clubbing, cyanosis or edema  Vascular: 2+ radial, femoral, DP/PT pulses B/L  Dermatologic: skin normal color and turgor; no visible rashes  Neurological:     MEDICATIONS:  MEDICATIONS  (STANDING):  aspirin enteric coated 81 milliGRAM(s) Oral every 24 hours  atorvastatin 80 milliGRAM(s) Oral at bedtime  chlorhexidine 2% Cloths 1 Application(s) Topical <User Schedule>  clopidogrel Tablet 75 milliGRAM(s) Oral daily  heparin  Infusion 1100 Unit(s)/Hr (11 mL/Hr) IV Continuous <Continuous>  metoprolol tartrate 12.5 milliGRAM(s) Oral every 12 hours    MEDICATIONS  (PRN):      ALLERGIES:  Allergies    No Known Allergies    Intolerances        LABS:                        14.4   11.91 )-----------( 207      ( 2019 06:04 )             41.8     07-26    139  |  102  |  10  ----------------------------<  112<H>  3.9   |  25  |  0.91    Ca    9.2      2019 14:30    TPro  7.2  /  Alb  4.5  /  TBili  0.3  /  DBili  x   /  AST  21  /  ALT  25  /  AlkPhos  91  07-26    LIVER FUNCTIONS - ( 2019 09:52 )  Alb: 4.5 g/dL / Pro: 7.2 g/dL / ALK PHOS: 91 U/L / ALT: 25 U/L / AST: 21 U/L / GGT: x           PT/INR - ( 2019 14:30 )   PT: 12.2 sec;   INR: 1.08          PTT - ( 2019 06:04 )  PTT:55.2 sec  Urinalysis Basic - ( 2019 16:20 )    Color: Yellow / Appearance: Clear / S.010 / pH: x  Gluc: x / Ketone: NEGATIVE  / Bili: Negative / Urobili: 0.2 E.U./dL   Blood: x / Protein: NEGATIVE mg/dL / Nitrite: NEGATIVE   Leuk Esterase: NEGATIVE / RBC: 5-10 /HPF / WBC < 5 /HPF   Sq Epi: x / Non Sq Epi: x / Bacteria: Rare /HPF      CARDIAC MARKERS ( 2019 00:13 )  x     / 8.22 ng/mL / 3712 U/L / x     / 225.4 ng/mL  CARDIAC MARKERS ( 2019 18:54 )  x     / 6.31 ng/mL / 3734 U/L / x     / 279.0 ng/mL  CARDIAC MARKERS ( 2019 14:30 )  x     / 2.40 ng/mL / 3218 U/L / x     / 255.2 ng/mL  CARDIAC MARKERS ( 2019 09:52 )  x     / <0.01 ng/mL / 255 U/L / x     / 4.5 ng/mL        RADIOLOGY & ADDITIONAL TESTS: Reviewed. INTERVAL HPI/OVERNIGHT EVENTS: Heparin ggt started. Patient therapeutic as of 10am labs (PTT 60.9, 55.2 56.6). Low threshold for chest pain, if patient has chest pain --> ballon pump.     SUBJECTIVE: Patient seen and examined at bedside. Patient reports intermittent chest pain that resolved on its own, better when sitting. Denies sob, left arm pain, or feeling diaphoretic.     OBJECTIVE:    VITAL SIGNS:  ICU Vital Signs Last 24 Hrs  T(C): 36.7 (2019 04:00), Max: 37.2 (2019 20:53)  T(F): 98 (2019 04:00), Max: 98.9 (2019 20:53)  HR: 80 (2019 06:00) (58 - 84)  BP: 90/71 (2019 06:00) (81/65 - 139/99)  BP(mean): 82 (2019 06:00) (68 - 117)  ABP: --  ABP(mean): --  RR: 29 (2019 06:00) (13 - 45)  SpO2: 98% (2019 06:00) (95% - 100%)        07-26 @ 07:01  -  07-27 @ 06:48  --------------------------------------------------------  IN: 510 mL / OUT: 3600 mL / NET: -3090 mL      CAPILLARY BLOOD GLUCOSE    PHYSICAL EXAM:    Constitutional: WN resting comfortably in bed; NAD  Head: NC/AT  Eyes: PERRL, EOMI, anicteric sclera  ENT: no nasal discharge; uvula midline, no oropharyngeal erythema or exudates; MMM  Neck: supple; no JVD or thyromegaly  Respiratory: CTA B/L; no W/R/R, no retractions  Cardiac: +S1/S2; RRR; no M/R/G; PMI non-displaced  Gastrointestinal: abdomen soft, NT/ND; no rebound or guarding; +BSx4  Extremities: WWP, no clubbing or cyanosis; no peripheral edema  Musculoskeletal: NROM x4; no joint swelling, tenderness or erythema  Vascular: 2+ radial, femoral, DP/PT pulses B/L  Dermatologic: skin warm, dry and intact;  Lymphatic: no LAD  Neurologic: AAOx3; CNII-XII grossly intact; no focal deficits    MEDICATIONS:  MEDICATIONS  (STANDING):  aspirin enteric coated 81 milliGRAM(s) Oral every 24 hours  atorvastatin 80 milliGRAM(s) Oral at bedtime  chlorhexidine 2% Cloths 1 Application(s) Topical <User Schedule>  clopidogrel Tablet 75 milliGRAM(s) Oral daily  heparin  Infusion 1100 Unit(s)/Hr (11 mL/Hr) IV Continuous <Continuous>  metoprolol tartrate 12.5 milliGRAM(s) Oral every 12 hours    MEDICATIONS  (PRN):      ALLERGIES:  Allergies    No Known Allergies    Intolerances        LABS:                        14.4   11.91 )-----------( 207      ( 2019 06:04 )             41.8     07-26    139  |  102  |  10  ----------------------------<  112<H>  3.9   |  25  |  0.91    Ca    9.2      2019 14:30    TPro  7.2  /  Alb  4.5  /  TBili  0.3  /  DBili  x   /  AST  21  /  ALT  25  /  AlkPhos  91  07-26    LIVER FUNCTIONS - ( 2019 09:52 )  Alb: 4.5 g/dL / Pro: 7.2 g/dL / ALK PHOS: 91 U/L / ALT: 25 U/L / AST: 21 U/L / GGT: x           PT/INR - ( 2019 14:30 )   PT: 12.2 sec;   INR: 1.08          PTT - ( 2019 06:04 )  PTT:55.2 sec  Urinalysis Basic - ( 2019 16:20 )    Color: Yellow / Appearance: Clear / S.010 / pH: x  Gluc: x / Ketone: NEGATIVE  / Bili: Negative / Urobili: 0.2 E.U./dL   Blood: x / Protein: NEGATIVE mg/dL / Nitrite: NEGATIVE   Leuk Esterase: NEGATIVE / RBC: 5-10 /HPF / WBC < 5 /HPF   Sq Epi: x / Non Sq Epi: x / Bacteria: Rare /HPF      CARDIAC MARKERS ( 2019 00:13 )  x     / 8.22 ng/mL / 3712 U/L / x     / 225.4 ng/mL  CARDIAC MARKERS ( 2019 18:54 )  x     / 6.31 ng/mL / 3734 U/L / x     / 279.0 ng/mL  CARDIAC MARKERS ( 2019 14:30 )  x     / 2.40 ng/mL / 3218 U/L / x     / 255.2 ng/mL  CARDIAC MARKERS ( 2019 09:52 )  x     / <0.01 ng/mL / 255 U/L / x     / 4.5 ng/mL        RADIOLOGY & ADDITIONAL TESTS: Reviewed.

## 2019-07-28 LAB
ANION GAP SERPL CALC-SCNC: 9 MMOL/L — SIGNIFICANT CHANGE UP (ref 5–17)
APTT BLD: 56.8 SEC — HIGH (ref 27.5–36.3)
APTT BLD: 73.3 SEC — HIGH (ref 27.5–36.3)
BUN SERPL-MCNC: 14 MG/DL — SIGNIFICANT CHANGE UP (ref 7–23)
CALCIUM SERPL-MCNC: 9 MG/DL — SIGNIFICANT CHANGE UP (ref 8.4–10.5)
CHLORIDE SERPL-SCNC: 100 MMOL/L — SIGNIFICANT CHANGE UP (ref 96–108)
CO2 SERPL-SCNC: 26 MMOL/L — SIGNIFICANT CHANGE UP (ref 22–31)
CREAT SERPL-MCNC: 0.96 MG/DL — SIGNIFICANT CHANGE UP (ref 0.5–1.3)
GLUCOSE SERPL-MCNC: 118 MG/DL — HIGH (ref 70–99)
HCT VFR BLD CALC: 42.1 % — SIGNIFICANT CHANGE UP (ref 39–50)
HGB BLD-MCNC: 14.2 G/DL — SIGNIFICANT CHANGE UP (ref 13–17)
MAGNESIUM SERPL-MCNC: 2 MG/DL — SIGNIFICANT CHANGE UP (ref 1.6–2.6)
MCHC RBC-ENTMCNC: 29.6 PG — SIGNIFICANT CHANGE UP (ref 27–34)
MCHC RBC-ENTMCNC: 33.7 GM/DL — SIGNIFICANT CHANGE UP (ref 32–36)
MCV RBC AUTO: 87.9 FL — SIGNIFICANT CHANGE UP (ref 80–100)
NRBC # BLD: 0 /100 WBCS — SIGNIFICANT CHANGE UP (ref 0–0)
PLATELET # BLD AUTO: 181 K/UL — SIGNIFICANT CHANGE UP (ref 150–400)
POTASSIUM SERPL-MCNC: 4 MMOL/L — SIGNIFICANT CHANGE UP (ref 3.5–5.3)
POTASSIUM SERPL-SCNC: 4 MMOL/L — SIGNIFICANT CHANGE UP (ref 3.5–5.3)
RBC # BLD: 4.79 M/UL — SIGNIFICANT CHANGE UP (ref 4.2–5.8)
RBC # FLD: 11.5 % — SIGNIFICANT CHANGE UP (ref 10.3–14.5)
SODIUM SERPL-SCNC: 135 MMOL/L — SIGNIFICANT CHANGE UP (ref 135–145)
WBC # BLD: 10.33 K/UL — SIGNIFICANT CHANGE UP (ref 3.8–10.5)
WBC # FLD AUTO: 10.33 K/UL — SIGNIFICANT CHANGE UP (ref 3.8–10.5)

## 2019-07-28 PROCEDURE — 93010 ELECTROCARDIOGRAM REPORT: CPT

## 2019-07-28 PROCEDURE — 71045 X-RAY EXAM CHEST 1 VIEW: CPT | Mod: 26

## 2019-07-28 PROCEDURE — 99231 SBSQ HOSP IP/OBS SF/LOW 25: CPT

## 2019-07-28 PROCEDURE — 99291 CRITICAL CARE FIRST HOUR: CPT

## 2019-07-28 RX ORDER — HEPARIN SODIUM 5000 [USP'U]/ML
1000 INJECTION INTRAVENOUS; SUBCUTANEOUS
Qty: 25000 | Refills: 0 | Status: DISCONTINUED | OUTPATIENT
Start: 2019-07-28 | End: 2019-07-30

## 2019-07-28 RX ADMIN — HEPARIN SODIUM 10 UNIT(S)/HR: 5000 INJECTION INTRAVENOUS; SUBCUTANEOUS at 07:43

## 2019-07-28 RX ADMIN — ATORVASTATIN CALCIUM 80 MILLIGRAM(S): 80 TABLET, FILM COATED ORAL at 21:48

## 2019-07-28 RX ADMIN — Medication 12.5 MILLIGRAM(S): at 19:03

## 2019-07-28 RX ADMIN — Medication 81 MILLIGRAM(S): at 11:38

## 2019-07-28 RX ADMIN — CLOPIDOGREL BISULFATE 75 MILLIGRAM(S): 75 TABLET, FILM COATED ORAL at 11:38

## 2019-07-28 RX ADMIN — Medication 12.5 MILLIGRAM(S): at 11:40

## 2019-07-28 RX ADMIN — HEPARIN SODIUM 10 UNIT(S)/HR: 5000 INJECTION INTRAVENOUS; SUBCUTANEOUS at 19:02

## 2019-07-28 NOTE — PROGRESS NOTE ADULT - SUBJECTIVE AND OBJECTIVE BOX
Patient discussed on morning rounds with Dr. Silva     Operation / Date: CAD, s/p JESSICA to RCA, now pre-op for CABG    SUBJECTIVE ASSESSMENT:  44y Male seen at bedside this AM.  He has had no chest pain since his initial procedure.  Doing well overall, no acute issues overnight.  Denies HA, AMS, CP, palpitations, SOB, cough, hemoptysis, n/v/d, fever.     Vital Signs Last 24 Hrs  T(C): 37.1 (2019 12:00), Max: 37.1 (2019 12:00)  T(F): 98.7 (2019 12:00), Max: 98.7 (2019 12:00)  HR: 88 (2019 14:00) (58 - 88)  BP: 133/92 (2019 14:00) (91/56 - 133/92)  BP(mean): 105 (2019 14:00) (65 - 114)  RR: 24 (2019 14:00) (16 - 32)  SpO2: 100% (2019 14:00) (91% - 100%)  I&O's Detail    2019 07:01  -  2019 07:00  --------------------------------------------------------  IN:    heparin Infusion: 55 mL    heparin Infusion: 10 mL    heparin Infusion: 132 mL    Oral Fluid: 910 mL  Total IN: 1107 mL    OUT:    Voided: 2000 mL  Total OUT: 2000 mL    Total NET: -893 mL      2019 07:01  -  2019 14:35  --------------------------------------------------------  IN:    heparin Infusion: 70 mL  Total IN: 70 mL    OUT:  Total OUT: 0 mL    Total NET: 70 mL    CHEST TUBE:  No.   ISABELLE DRAIN:  No.  EPICARDIAL WIRES: No.  TIE DOWNS: No.  DÍAZ: No.     PHYSICAL EXAM:  General: OOB to chair, no acute distress.  Neurological: AAOx3, no AMS or focal deficits.   Cardiovascular: RRR, S1/S2, no m/r/g.   Respiratory: No acute distress.  CTA b/l, no w/r/r.   Gastrointestinal: ND, NBS, non-TTP.   Extremities: Warm and well perfused, no calf ttp or edema b/l.   Vascular: Pulses 2+ throughout  Incision Sites: Cath site: C/D/I, no hematoma.     LABS:                        14.2   10.33 )-----------( 181      ( 2019 05:24 )             42.1       COUMADIN:  No.- Hep gtt for CAD    PT/INR - ( 2019 05:24 )   PT: 13.1 sec;   INR: 1.15          PTT - ( 2019 05:24 )  PTT:73.3 sec        135  |  100  |  14  ----------------------------<  118<H>  4.0   |  26  |  0.96    Ca    9.0      2019 05:24  Mg     2.0         Urinalysis Basic - ( 2019 16:20 )    Color: Yellow / Appearance: Clear / S.010 / pH: x  Gluc: x / Ketone: NEGATIVE  / Bili: Negative / Urobili: 0.2 E.U./dL   Blood: x / Protein: NEGATIVE mg/dL / Nitrite: NEGATIVE   Leuk Esterase: NEGATIVE / RBC: 5-10 /HPF / WBC < 5 /HPF   Sq Epi: x / Non Sq Epi: x / Bacteria: Rare /HPF    MEDICATIONS  (STANDING):  aspirin enteric coated 81 milliGRAM(s) Oral every 24 hours  atorvastatin 80 milliGRAM(s) Oral at bedtime  chlorhexidine 2% Cloths 1 Application(s) Topical <User Schedule>  clopidogrel Tablet 75 milliGRAM(s) Oral daily  heparin  Infusion 1000 Unit(s)/Hr (10 mL/Hr) IV Continuous <Continuous>  metoprolol tartrate 12.5 milliGRAM(s) Oral every 12 hours    MEDICATIONS  (PRN):      RADIOLOGY & ADDITIONAL TESTS:  < from: Echocardiogram (19 @ 16:12) >  Interpretation Summary  There is borderline concentric left ventricular hypertrophy.Mild   segmental left   ventricular systolic dysfunction with akinesis of basal inferior and   hypokinesis of the inferolateral wall.  The left ventricular ejection   fraction   isestimated to be 45-50%The right ventricle is normal in size and   function.The left atrial size is normal.The mitral inflow pattern is   consistent with impaired left ventricular relaxation with mildly   elevated left   atrial pressure (8-14mmHg).  Right atrial size is normal.No evidence for   any   hemodynamically significant valvular disease.There was insufficient TR   detected from which to calculate pulmonary artery systolic pressure.    There is   no pericardial effusion.    < end of copied text >    < from: US Duplex Carotid Arteries Complete, Bilateral (19 @ 13:00) >  IMPRESSION:  No hemodynamically significant stenosis.  Mild atherosclerotic changes as above.    < end of copied text >    < from: Xray Chest 1 View- PORTABLE-Routine (19 @ 06:41) >    EXAM:  XR CHEST PORTABLE ROUTINE 1V                          PROCEDURE DATE:  2019          INTERPRETATION:  Clinical History: Chest pain    Portable examination the chest demonstrates the heart to be within normal   limits in transverse diameter. No acute infiltrates. Degenerative changes   thoracic spine.    Impression: No acute infiltrates    < end of copied text >

## 2019-07-28 NOTE — PROGRESS NOTE ADULT - PROBLEM SELECTOR PLAN 1
Patient presented with chest pain found to have inferior wall MI. Now s/p JESSICA to mid-RCA. 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 Plan for CABG on monday.   - Pt is s/p aspirin and Brilinta load at 9:45 AM, instead of Brilinta will switch over to Plavix as he is going for CABG.   - Continue aspirin 81mg daily starting tomorrow   - Continue Plavix 75mg qd starting tomorrow  - Continue lipitor 80mg at bedtime  - Continue lopressor 12.5 BID  - Heparin ggt therapeutic, continue @ 11ml/hr  - After CABG can restart lisinopril should BPs allow  - Lipid panel (Cholesterol 264, , HDL 43, TGs 110), TSH (1.197) and HbA1c (5.6)  - Echo showed EF of 45-50%, Mild segmental left ventricular systolic dysfunction with akinesis of basal inferior and hypokinesis of the inferolateral wall.  - Troponin peaked at 8.22 > 7.55, no need to trend  - Close watch, should patient have chest pain, low threshold for ballon pump  - f/u Carotid doppler  - f/u PFTs Patient presented with chest pain found to have inferior wall MI. Now s/p JESSICA to mid-RCA. 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  - CABG scheduled as first case on Tuesday (patient aware)  - Pre-op preparation: PFTs complete (results in paper chart), T&S/coags for Monday AM  - Continue aspirin 81 mg PO daily  - Continue Plavix 75 mg PO daily  - Continue lipitor 80 mg PO at bedtime  - Continue lopressor 12.5 mg PO BID  - Heparin ggt decreased to 10 mL/hr because AM PTT came back supratherapeutic (73.3)  - f/u 12p PTT  - Close watch, should patient have chest pain, low threshold for balloon pump  - After CABG can restart lisinopril should BPs allow  - Echo showed EF of 45-50%, Mild segmental left ventricular systolic dysfunction with akinesis of basal inferior and hypokinesis of the inferolateral wall.  - Lipid panel (Cholesterol 264, , HDL 43, TGs 110), TSH (1.197) and HbA1c (5.6).  - Troponin peaked at 8.22 > 7.55, no need to trend  - Carotid doppler (7/27) showing mild atherosclerotic disease

## 2019-07-28 NOTE — PROGRESS NOTE ADULT - PROBLEM SELECTOR PLAN 3
DVT prophylaxis: heparin ggt  Code status: Full code   Dispo: CCU DVT prophylaxis: heparin ggt at 10 mL/hr  Code status: FULL CODE   Dispo: CCU

## 2019-07-28 NOTE — PROGRESS NOTE ADULT - SUBJECTIVE AND OBJECTIVE BOX
INTERVAL HPI/OVERNIGHT EVENTS:    SUBJECTIVE: Patient seen and examined at bedside.  ROS:     OBJECTIVE:  VITAL SIGNS:  ICU Vital Signs Last 24 Hrs  T(C): 36.4 (2019 05:36), Max: 37 (2019 17:00)  T(F): 97.5 (2019 05:36), Max: 98.6 (2019 17:00)  HR: 88 (2019 09:00) (58 - 88)  BP: 133/74 (2019 09:00) (91/56 - 133/74)  BP(mean): 114 (2019 09:00) (65 - 114)  ABP: --  ABP(mean): --  RR: 24 (2019 09:00) (16 - 32)  SpO2: 98% (2019 09:00) (91% - 100%)         @ 07: @ 07:00  --------------------------------------------------------  IN: 1107 mL / OUT: 2000 mL / NET: -893 mL     @ 07: @ 09:45  --------------------------------------------------------  IN: 10 mL / OUT: 0 mL / NET: 10 mL      CAPILLARY BLOOD GLUCOSE          PHYSICAL EXAM:  General: WDWN ; NAD  HEENT: NC/AT; PERRL, anicteric sclera  Neck: supple, no JVD  Respiratory: CTA B/L; no W/R/R  Cardiovascular: +S1/S2; RRR; no M/R/G  Gastrointestinal: soft, NT/ND; +BS x4  Extremities: WWP; 2+ peripheral pulses B/L; no LE edema  Skin: normal color and turgor; no rash  Neurological:     MEDICATIONS:  MEDICATIONS  (STANDING):  aspirin enteric coated 81 milliGRAM(s) Oral every 24 hours  atorvastatin 80 milliGRAM(s) Oral at bedtime  chlorhexidine 2% Cloths 1 Application(s) Topical <User Schedule>  clopidogrel Tablet 75 milliGRAM(s) Oral daily  heparin  Infusion 1000 Unit(s)/Hr (10 mL/Hr) IV Continuous <Continuous>  metoprolol tartrate 12.5 milliGRAM(s) Oral every 12 hours    MEDICATIONS  (PRN):      ALLERGIES:  Allergies    No Known Allergies    Intolerances        LABS:                        14.2   10.33 )-----------( 181      ( 2019 05:24 )             42.1         135  |  100  |  14  ----------------------------<  118<H>  4.0   |  26  |  0.96    Ca    9.0      2019 05:24  Mg     2.0         TPro  7.2  /  Alb  4.5  /  TBili  0.3  /  DBili  x   /  AST  21  /  ALT  25  /  AlkPhos  91      LIVER FUNCTIONS - ( 2019 09:52 )  Alb: 4.5 g/dL / Pro: 7.2 g/dL / ALK PHOS: 91 U/L / ALT: 25 U/L / AST: 21 U/L / GGT: x           PT/INR - ( 2019 14:30 )   PT: 12.2 sec;   INR: 1.08          PTT - ( 2019 05:24 )  PTT:73.3 sec  Urinalysis Basic - ( 2019 16:20 )    Color: Yellow / Appearance: Clear / S.010 / pH: x  Gluc: x / Ketone: NEGATIVE  / Bili: Negative / Urobili: 0.2 E.U./dL   Blood: x / Protein: NEGATIVE mg/dL / Nitrite: NEGATIVE   Leuk Esterase: NEGATIVE / RBC: 5-10 /HPF / WBC < 5 /HPF   Sq Epi: x / Non Sq Epi: x / Bacteria: Rare /HPF      CARDIAC MARKERS ( 2019 06:04 )  x     / 7.55 ng/mL / 2927 U/L / x     / 154.1 ng/mL  CARDIAC MARKERS ( 2019 00:13 )  x     / 8.22 ng/mL / 3712 U/L / x     / 225.4 ng/mL  CARDIAC MARKERS ( 2019 18:54 )  x     / 6.31 ng/mL / 3734 U/L / x     / 279.0 ng/mL  CARDIAC MARKERS ( 2019 14:30 )  x     / 2.40 ng/mL / 3218 U/L / x     / 255.2 ng/mL  CARDIAC MARKERS ( 2019 09:52 )  x     / <0.01 ng/mL / 255 U/L / x     / 4.5 ng/mL        RADIOLOGY, EKG & ADDITIONAL TESTS: Reviewed. INTERVAL HPI/OVERNIGHT EVENTS: no acute events overnight    SUBJECTIVE: Patient seen and examined at bedside with no complaints. Endorses dizziness when getting out of bed. Feels well overall. +BM +UOP +PO intake  ROS: denies HA, N/V, SOB, CP, abdominal pain    OBJECTIVE:  VITAL SIGNS:  ICU Vital Signs Last 24 Hrs  T(C): 36.4 (2019 05:36), Max: 37 (2019 17:00)  T(F): 97.5 (2019 05:36), Max: 98.6 (2019 17:00)  HR: 88 (2019 09:00) (58 - 88)  BP: 133/74 (2019 09:00) (91/56 - 133/74)  BP(mean): 114 (2019 09:00) (65 - 114)  RR: 24 (2019 09:00) (16 - 32)  SpO2: 98% (2019 09:00) (91% - 100%)     @ : @ 07:00  --------------------------------------------------------  IN: 1107 mL / OUT: 2000 mL / NET: -893 mL     @ 07: @ 09:45  --------------------------------------------------------  IN: 10 mL / OUT: 0 mL / NET: 10 mL    PHYSICAL EXAM:  General: awake and alert, in no acute distress, lying comfortably in bed  Respiratory: CTA B/L; no W/R/R  Cardiovascular: +S1/S2; RRR; no M/R/G  Gastrointestinal: soft, NT/ND; +BS  Extremities: WWP; 2+ radial/PT B/L; no LE edema    MEDICATIONS:  MEDICATIONS  (STANDING):  aspirin enteric coated 81 milliGRAM(s) Oral every 24 hours  atorvastatin 80 milliGRAM(s) Oral at bedtime  chlorhexidine 2% Cloths 1 Application(s) Topical <User Schedule>  clopidogrel Tablet 75 milliGRAM(s) Oral daily  heparin  Infusion 1000 Unit(s)/Hr (10 mL/Hr) IV Continuous <Continuous>  metoprolol tartrate 12.5 milliGRAM(s) Oral every 12 hours    ALLERGIES:  No Known Allergies    LABS:                        14.2   10.33 )-----------( 181      ( 2019 05:24 )             42.1         135  |  100  |  14  ----------------------------<  118<H>  4.0   |  26  |  0.96    Ca    9.0      2019 05:24  Mg     2.0         TPro  7.2  /  Alb  4.5  /  TBili  0.3  /  DBili  x   /  AST  21  /  ALT  25  /  AlkPhos  91  07    LIVER FUNCTIONS - ( 2019 09:52 )  Alb: 4.5 g/dL / Pro: 7.2 g/dL / ALK PHOS: 91 U/L / ALT: 25 U/L / AST: 21 U/L / GGT: x           PT/INR - ( 2019 14:30 )   PT: 12.2 sec;   INR: 1.08     PTT - ( 2019 05:24 )  PTT:73.3 sec    Urinalysis Basic - ( 2019 16:20 )  Color: Yellow / Appearance: Clear / S.010 / pH: x  Gluc: x / Ketone: NEGATIVE  / Bili: Negative / Urobili: 0.2 E.U./dL   Blood: x / Protein: NEGATIVE mg/dL / Nitrite: NEGATIVE   Leuk Esterase: NEGATIVE / RBC: 5-10 /HPF / WBC < 5 /HPF   Sq Epi: x / Non Sq Epi: x / Bacteria: Rare /HPF    CARDIAC MARKERS ( 2019 06:04 )  x     / 7.55 ng/mL / 2927 U/L / x     / 154.1 ng/mL  CARDIAC MARKERS ( 2019 00:13 )  x     / 8.22 ng/mL / 3712 U/L / x     / 225.4 ng/mL  CARDIAC MARKERS ( 2019 18:54 )  x     / 6.31 ng/mL / 3734 U/L / x     / 279.0 ng/mL  CARDIAC MARKERS ( 2019 14:30 )  x     / 2.40 ng/mL / 3218 U/L / x     / 255.2 ng/mL  CARDIAC MARKERS ( 2019 09:52 )  x     / <0.01 ng/mL / 255 U/L / x     / 4.5 ng/mL    RADIOLOGY, EKG & ADDITIONAL TESTS: Reviewed.

## 2019-07-28 NOTE — PROGRESS NOTE ADULT - ASSESSMENT
44y Male with no significant PMHx who presented to the ED this AM with 2 day history of CP and tightness.  He endorses that he was at work this AM and had an acute worsening in the chest pressure.  He became diaphoretic and clammy.  The chest pressure was so intense that he vomited.  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  In the ED, he was found to have EKG changes in the inferior leads with reciprocal changes in the anterior and lateral leads.  He was loaded with ASA/brillinta and sent to cath labs.   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  CT surgery is consulted for surgical evaluation.    1. 3vCAD  -Pre-op workup in progress for CABG, now planned for Tuesday.   -C/w ASA, statin, plavix for recent stent to mRCA, BB  -ECHO, carotids, EKG, CXR done.    -Please ensure updated T/S x 2 by OR date  -Will place pre-op orders on Monday evening.   -To 9E post-procedure.   -Given severe CAD and recent STEMI, maintain low threshold for IABP insertion if chest pain manifests.   -Continue to hold ACE/ARBS perioperatively for risk of HD instability.     2. Acute systolic CHF, EF 45%  -No prior medical history of CHF, likely ischemic cardiomyopathy.   -No signs of fluid overload or decompensation.    -Management per primary team.     3. HLD  -C/w statin    4. PPX:  -DVT and GI ppx    Dispo: OR on tuesday now.  Discussed with patient. 44 year old male with no prior medical history who presented to [facility] ED on 7/26/19 with chest pain and tightness x 2 days with associated diaphoresis and n/v. He was r/i for STEMI upon presentation, ASA/Brilinta loaded and sent to cath lab where he underwent JESSICA to mRCA with additional finding of severe 3vCAD with LM disease.  Dr. Cooper consulted for surgical evaluation, pre-op workup in progress. Planned for OR on Tuesday.     1. 3vCAD  -Pre-op workup in progress for CABG, now planned for Tuesday.   -C/w ASA, statin, plavix for recent stent to mRCA, BB  -ECHO, carotids, EKG, CXR done.    -Please ensure updated T/S x 2 by OR date  -Will place pre-op orders on Monday evening.   -To 9E post-procedure.   -Given severe CAD and recent STEMI, maintain low threshold for IABP insertion if chest pain manifests.   -Continue to hold ACE/ARBS perioperatively for risk of HD instability.     2. Acute systolic CHF, EF 45%  -No prior medical history of CHF, likely ischemic cardiomyopathy.   -No signs of fluid overload or decompensation.    -Management per primary team.     3. HLD  -C/w statin    4. PPX:  -DVT and GI ppx    Dispo: OR on tuesday now.  Discussed with patient.

## 2019-07-29 LAB
ANION GAP SERPL CALC-SCNC: 11 MMOL/L — SIGNIFICANT CHANGE UP (ref 5–17)
APTT BLD: 61.9 SEC — HIGH (ref 27.5–36.3)
BLD GP AB SCN SERPL QL: NEGATIVE — SIGNIFICANT CHANGE UP
BUN SERPL-MCNC: 18 MG/DL — SIGNIFICANT CHANGE UP (ref 7–23)
CALCIUM SERPL-MCNC: 9 MG/DL — SIGNIFICANT CHANGE UP (ref 8.4–10.5)
CHLORIDE SERPL-SCNC: 101 MMOL/L — SIGNIFICANT CHANGE UP (ref 96–108)
CO2 SERPL-SCNC: 23 MMOL/L — SIGNIFICANT CHANGE UP (ref 22–31)
CREAT SERPL-MCNC: 0.94 MG/DL — SIGNIFICANT CHANGE UP (ref 0.5–1.3)
GLUCOSE SERPL-MCNC: 123 MG/DL — HIGH (ref 70–99)
HCT VFR BLD CALC: 42.8 % — SIGNIFICANT CHANGE UP (ref 39–50)
HGB BLD-MCNC: 14.3 G/DL — SIGNIFICANT CHANGE UP (ref 13–17)
INR BLD: 1.04 — SIGNIFICANT CHANGE UP (ref 0.88–1.16)
MAGNESIUM SERPL-MCNC: 1.9 MG/DL — SIGNIFICANT CHANGE UP (ref 1.6–2.6)
MCHC RBC-ENTMCNC: 29.7 PG — SIGNIFICANT CHANGE UP (ref 27–34)
MCHC RBC-ENTMCNC: 33.4 GM/DL — SIGNIFICANT CHANGE UP (ref 32–36)
MCV RBC AUTO: 89 FL — SIGNIFICANT CHANGE UP (ref 80–100)
NRBC # BLD: 0 /100 WBCS — SIGNIFICANT CHANGE UP (ref 0–0)
PLATELET # BLD AUTO: 190 K/UL — SIGNIFICANT CHANGE UP (ref 150–400)
POTASSIUM SERPL-MCNC: 4.1 MMOL/L — SIGNIFICANT CHANGE UP (ref 3.5–5.3)
POTASSIUM SERPL-SCNC: 4.1 MMOL/L — SIGNIFICANT CHANGE UP (ref 3.5–5.3)
PROTHROM AB SERPL-ACNC: 11.8 SEC — SIGNIFICANT CHANGE UP (ref 10–12.9)
RBC # BLD: 4.81 M/UL — SIGNIFICANT CHANGE UP (ref 4.2–5.8)
RBC # FLD: 11.3 % — SIGNIFICANT CHANGE UP (ref 10.3–14.5)
RH IG SCN BLD-IMP: POSITIVE — SIGNIFICANT CHANGE UP
SODIUM SERPL-SCNC: 135 MMOL/L — SIGNIFICANT CHANGE UP (ref 135–145)
WBC # BLD: 10.63 K/UL — HIGH (ref 3.8–10.5)
WBC # FLD AUTO: 10.63 K/UL — HIGH (ref 3.8–10.5)

## 2019-07-29 PROCEDURE — 94010 BREATHING CAPACITY TEST: CPT | Mod: 26

## 2019-07-29 PROCEDURE — 93010 ELECTROCARDIOGRAM REPORT: CPT

## 2019-07-29 PROCEDURE — 99291 CRITICAL CARE FIRST HOUR: CPT

## 2019-07-29 PROCEDURE — 71045 X-RAY EXAM CHEST 1 VIEW: CPT | Mod: 26

## 2019-07-29 RX ORDER — CHLORHEXIDINE GLUCONATE 213 G/1000ML
1 SOLUTION TOPICAL ONCE
Refills: 0 | Status: COMPLETED | OUTPATIENT
Start: 2019-07-29 | End: 2019-07-29

## 2019-07-29 RX ORDER — CLOPIDOGREL BISULFATE 75 MG/1
75 TABLET, FILM COATED ORAL EVERY 24 HOURS
Refills: 0 | Status: DISCONTINUED | OUTPATIENT
Start: 2019-07-29 | End: 2019-07-30

## 2019-07-29 RX ORDER — CHLORHEXIDINE GLUCONATE 213 G/1000ML
30 SOLUTION TOPICAL ONCE
Refills: 0 | Status: COMPLETED | OUTPATIENT
Start: 2019-07-29 | End: 2019-07-30

## 2019-07-29 RX ORDER — MAGNESIUM SULFATE 500 MG/ML
1 VIAL (ML) INJECTION ONCE
Refills: 0 | Status: COMPLETED | OUTPATIENT
Start: 2019-07-29 | End: 2019-07-29

## 2019-07-29 RX ADMIN — Medication 81 MILLIGRAM(S): at 09:39

## 2019-07-29 RX ADMIN — HEPARIN SODIUM 10 UNIT(S)/HR: 5000 INJECTION INTRAVENOUS; SUBCUTANEOUS at 21:29

## 2019-07-29 RX ADMIN — CHLORHEXIDINE GLUCONATE 1 APPLICATION(S): 213 SOLUTION TOPICAL at 06:24

## 2019-07-29 RX ADMIN — CHLORHEXIDINE GLUCONATE 1 APPLICATION(S): 213 SOLUTION TOPICAL at 06:33

## 2019-07-29 RX ADMIN — Medication 100 GRAM(S): at 06:22

## 2019-07-29 RX ADMIN — CHLORHEXIDINE GLUCONATE 1 APPLICATION(S): 213 SOLUTION TOPICAL at 19:29

## 2019-07-29 RX ADMIN — ATORVASTATIN CALCIUM 80 MILLIGRAM(S): 80 TABLET, FILM COATED ORAL at 21:29

## 2019-07-29 RX ADMIN — CLOPIDOGREL BISULFATE 75 MILLIGRAM(S): 75 TABLET, FILM COATED ORAL at 12:34

## 2019-07-29 RX ADMIN — Medication 12.5 MILLIGRAM(S): at 19:18

## 2019-07-29 RX ADMIN — Medication 12.5 MILLIGRAM(S): at 06:22

## 2019-07-29 NOTE — PROGRESS NOTE ADULT - PROBLEM SELECTOR PROBLEM 1
ST elevation myocardial infarction (STEMI), unspecified artery

## 2019-07-29 NOTE — PROGRESS NOTE ADULT - ASSESSMENT
45yo M with no PMHx (Fhx of father with ACS s/p CABG age ~46yo) who presented to the ED with crushing chest pain and left arm pain, was found to have an inferior/posterior wall STEMI now s/p JESSICA to mid-RCA and pending CABG on Tuesday for left main disease.

## 2019-07-29 NOTE — PROGRESS NOTE ADULT - ASSESSMENT
44 year old male with no prior medical history who presented to [facility] ED on 7/26/19 with chest pain and tightness x 2 days with associated diaphoresis and n/v. He was r/i for STEMI upon presentation, ASA/Brilinta loaded and sent to cath lab where he underwent JESSICA to mRCA with additional finding of severe 3vCAD with LM disease.  Dr. Cooper consulted for surgical evaluation, pre-op workup in progress. Planned for OR on Tuesday.     1. 3vCAD  -Pre-op workup in progress for CABG, now planned for Tuesday.   -C/w ASA, statin, heparin gtt for recent stent to mRCA, BB  -ECHO, carotids, EKG, CXR done.    -Pre-op orders placed  -To 9E post-procedure.   -Given severe CAD and recent STEMI, maintain low threshold for IABP insertion if chest pain manifests.   -Continue to hold ACE/ARBS perioperatively for risk of HD instability.     2. Acute systolic CHF, EF 45%  -No prior medical history of CHF, likely ischemic cardiomyopathy.   -No signs of fluid overload or decompensation.    -Management per primary team.     3. HLD  -C/w statin    4. PPX:  -DVT and GI ppx    Dispo: OR tomorrow

## 2019-07-29 NOTE — PROGRESS NOTE ADULT - SUBJECTIVE AND OBJECTIVE BOX
INTERVAL HPI/OVERNIGHT EVENTS:    SUBJECTIVE: Patient seen and examined at bedside.    OBJECTIVE:    VITAL SIGNS:  ICU Vital Signs Last 24 Hrs  T(C): 36.8 (29 Jul 2019 05:01), Max: 37.1 (28 Jul 2019 12:00)  T(F): 98.3 (29 Jul 2019 05:01), Max: 98.8 (28 Jul 2019 16:45)  HR: 72 (29 Jul 2019 06:05) (60 - 96)  BP: 105/76 (29 Jul 2019 06:05) (92/58 - 133/92)  BP(mean): 86 (29 Jul 2019 06:05) (70 - 114)  ABP: --  ABP(mean): --  RR: 19 (29 Jul 2019 06:05) (16 - 33)  SpO2: 97% (29 Jul 2019 06:05) (94% - 100%)        07-27 @ 07:01  - 07-28 @ 07:00  --------------------------------------------------------  IN: 1107 mL / OUT: 2000 mL / NET: -893 mL    07-28 @ 07:01 - 07-29 @ 06:58  --------------------------------------------------------  IN: 795 mL / OUT: 450 mL / NET: 345 mL      CAPILLARY BLOOD GLUCOSE    PHYSICAL EXAM:    Constitutional: WDWN resting comfortably in bed; NAD  HEENT: NC/AT; PERRL, anicteric sclera; MMM  Neck: supple, no JVD  Cardiovascular: +S1/S2; RRR; no M/R/G  Respiratory: CTA B/L; no W/R/R; respirations appear non-labored  Gastrointestinal: abdomen soft, NT/ND; +BS x4  Extremities: WWP; no clubbing, cyanosis or edema  Vascular: 2+ radial, femoral, DP/PT pulses B/L  Dermatologic: skin normal color and turgor; no visible rashes  Neurological:     MEDICATIONS:  MEDICATIONS  (STANDING):  aspirin enteric coated 81 milliGRAM(s) Oral every 24 hours  atorvastatin 80 milliGRAM(s) Oral at bedtime  chlorhexidine 2% Cloths 1 Application(s) Topical <User Schedule>  heparin  Infusion 1000 Unit(s)/Hr (10 mL/Hr) IV Continuous <Continuous>  metoprolol tartrate 12.5 milliGRAM(s) Oral every 12 hours    MEDICATIONS  (PRN):      ALLERGIES:  Allergies    No Known Allergies    Intolerances    LABS:                        14.3   10.63 )-----------( 190      ( 29 Jul 2019 04:39 )             42.8     07-29    135  |  101  |  18  ----------------------------<  123<H>  4.1   |  23  |  0.94    Ca    9.0      29 Jul 2019 04:39  Mg     1.9     07-29        PT/INR - ( 29 Jul 2019 04:39 )   PT: 11.8 sec;   INR: 1.04          PTT - ( 29 Jul 2019 04:39 )  PTT:61.9 sec          RADIOLOGY & ADDITIONAL TESTS: Reviewed. INTERVAL HPI/OVERNIGHT EVENTS: ?plavix d/c'd?    SUBJECTIVE: Patient seen and examined at bedside. Patient had no complaints. Denies chest pain, SOB, diaphoresis.     OBJECTIVE:    VITAL SIGNS:  ICU Vital Signs Last 24 Hrs  T(C): 36.8 (29 Jul 2019 05:01), Max: 37.1 (28 Jul 2019 12:00)  T(F): 98.3 (29 Jul 2019 05:01), Max: 98.8 (28 Jul 2019 16:45)  HR: 72 (29 Jul 2019 06:05) (60 - 96)  BP: 105/76 (29 Jul 2019 06:05) (92/58 - 133/92)  BP(mean): 86 (29 Jul 2019 06:05) (70 - 114)  ABP: --  ABP(mean): --  RR: 19 (29 Jul 2019 06:05) (16 - 33)  SpO2: 97% (29 Jul 2019 06:05) (94% - 100%)        07-27 @ 07:01 - 07-28 @ 07:00  --------------------------------------------------------  IN: 1107 mL / OUT: 2000 mL / NET: -893 mL    07-28 @ 07:01  -  07-29 @ 06:58  --------------------------------------------------------  IN: 795 mL / OUT: 450 mL / NET: 345 mL      CAPILLARY BLOOD GLUCOSE    PHYSICAL EXAM:    Constitutional: WDWN resting comfortably in bed; NAD  HEENT: NC/AT; PERRL, anicteric sclera; MMM  Neck: supple, no JVD  Cardiovascular: +S1/S2; RRR; no M/R/G  Respiratory: CTA B/L; no W/R/R; respirations appear non-labored  Gastrointestinal: abdomen soft, NT/ND; +BS x4  Extremities: WWP; no clubbing, cyanosis or edema  Vascular: 2+ radial, femoral, DP/PT pulses B/L  Dermatologic: skin normal color and turgor; no visible rashes  Neurological: AAOx4    MEDICATIONS:  MEDICATIONS  (STANDING):  aspirin enteric coated 81 milliGRAM(s) Oral every 24 hours  atorvastatin 80 milliGRAM(s) Oral at bedtime  chlorhexidine 2% Cloths 1 Application(s) Topical <User Schedule>  heparin  Infusion 1000 Unit(s)/Hr (10 mL/Hr) IV Continuous <Continuous>  metoprolol tartrate 12.5 milliGRAM(s) Oral every 12 hours    MEDICATIONS  (PRN):      ALLERGIES:  Allergies    No Known Allergies    Intolerances    LABS:                        14.3   10.63 )-----------( 190      ( 29 Jul 2019 04:39 )             42.8     07-29    135  |  101  |  18  ----------------------------<  123<H>  4.1   |  23  |  0.94    Ca    9.0      29 Jul 2019 04:39  Mg     1.9     07-29        PT/INR - ( 29 Jul 2019 04:39 )   PT: 11.8 sec;   INR: 1.04          PTT - ( 29 Jul 2019 04:39 )  PTT:61.9 sec          RADIOLOGY & ADDITIONAL TESTS: Reviewed. INTERVAL HPI/OVERNIGHT EVENTS: Overnight team held AM plavix. CT surgery recommended continuing the plavix. Plavix restarted, patient did not miss a dose.     SUBJECTIVE: Patient seen and examined at bedside. Patient had no complaints. Denies chest pain, SOB, diaphoresis.     OBJECTIVE:    VITAL SIGNS:  ICU Vital Signs Last 24 Hrs  T(C): 36.8 (29 Jul 2019 05:01), Max: 37.1 (28 Jul 2019 12:00)  T(F): 98.3 (29 Jul 2019 05:01), Max: 98.8 (28 Jul 2019 16:45)  HR: 72 (29 Jul 2019 06:05) (60 - 96)  BP: 105/76 (29 Jul 2019 06:05) (92/58 - 133/92)  BP(mean): 86 (29 Jul 2019 06:05) (70 - 114)  ABP: --  ABP(mean): --  RR: 19 (29 Jul 2019 06:05) (16 - 33)  SpO2: 97% (29 Jul 2019 06:05) (94% - 100%)        07-27 @ 07:01 - 07-28 @ 07:00  --------------------------------------------------------  IN: 1107 mL / OUT: 2000 mL / NET: -893 mL    07-28 @ 07:01  -  07-29 @ 06:58  --------------------------------------------------------  IN: 795 mL / OUT: 450 mL / NET: 345 mL      CAPILLARY BLOOD GLUCOSE    PHYSICAL EXAM:    Constitutional: WDWN resting comfortably in bed; NAD  HEENT: NC/AT; PERRL, anicteric sclera; MMM  Neck: supple, no JVD  Cardiovascular: +S1/S2; RRR; no M/R/G  Respiratory: CTA B/L; no W/R/R; respirations appear non-labored  Gastrointestinal: abdomen soft, NT/ND; +BS x4  Extremities: WWP; no clubbing, cyanosis or edema  Vascular: 2+ radial, femoral, DP/PT pulses B/L  Dermatologic: skin normal color and turgor; no visible rashes  Neurological: AAOx4    MEDICATIONS:  MEDICATIONS  (STANDING):  aspirin enteric coated 81 milliGRAM(s) Oral every 24 hours  atorvastatin 80 milliGRAM(s) Oral at bedtime  chlorhexidine 2% Cloths 1 Application(s) Topical <User Schedule>  heparin  Infusion 1000 Unit(s)/Hr (10 mL/Hr) IV Continuous <Continuous>  metoprolol tartrate 12.5 milliGRAM(s) Oral every 12 hours    MEDICATIONS  (PRN):      ALLERGIES:  Allergies    No Known Allergies    Intolerances    LABS:                        14.3   10.63 )-----------( 190      ( 29 Jul 2019 04:39 )             42.8     07-29    135  |  101  |  18  ----------------------------<  123<H>  4.1   |  23  |  0.94    Ca    9.0      29 Jul 2019 04:39  Mg     1.9     07-29        PT/INR - ( 29 Jul 2019 04:39 )   PT: 11.8 sec;   INR: 1.04          PTT - ( 29 Jul 2019 04:39 )  PTT:61.9 sec          RADIOLOGY & ADDITIONAL TESTS: Reviewed.

## 2019-07-29 NOTE — PROGRESS NOTE ADULT - ATTENDING COMMENTS
Pt is a 45 y/o man c strong family h/o CAD (father c CABG @ 45, uncle  of MI @ age 28) who p/w inf post stemi  ED ECG: sb @ 56 c 3mm WIL II, III, AVF, depressions V1-V2, I & AVL  Emergent Cath: 100% RCA now s/p PCI  dLM 90-95%, 99% trifurcation lesion LAD/D1/LCX  V-gram: LVEDP 35, EF ~ 30-35%  REGIS: EF 45-50%    Pt with mild chest pain overnt and this morning    afebrile, 1114/77, HR 70, 100% RA    Hgb 14.4  Plt 207  Cr 0.98  Glc 151  Joseph -ve --> 8 --> 7.55  CKMB 225.4 --> 154.1  INR 1    CXR: clr wnl    - pt with inf post stemi  - plan is for CABG on Monday for dLM and trifurcation lesion  - cont asa, pt loaded on brilinta  - cont plavix today & cont pt on hep  - pt will get CABG on plavix  - contlipitor 80mg  - would cont to observe for pulm edema as pt in acute sytolic CHF c LVEDP ~35, EF 35% in cath lab  - contlow dose B-blocker  - would hold ACE for now, would start immediately postoperatively  - check lipid panel, HgbA1C  - cont to monitor for chest pain and would have low threshold for IABP
Pt is a 45 y/o man c strong family h/o CAD (father c CABG @ 45, uncle  of MI @ age 28) who p/w inf post stemi  ED ECG: sb @ 56 c 3mm WIL II, III, AVF, depressions V1-V2, I & AVL  Emergent Cath: 100% RCA now s/p PCI  dLM 90-95%, 99% trifurcation lesion LAD/D1/LCX  V-gram: LVEDP 35, EF ~ 30-35%  REGIS: EF 45-50%    Pt s chest pain today    afebrile, 115/87, HR 55, 100% RA    Hgb 14.4  Cr 0.96  Joseph -ve --> 8 --> 7.55  CKMB 225.4 --> 154.1  INR 1    Chol 264    HDL 43    A1C 5.3    CXR: clr wnl    - pt with inf post stemi  - plan is for CABG on Monday for dLM and trifurcation lesion  - cont asa, pt loaded on brilinta  - cont plavix today & cont pt on hep  - pt will get CABG on plavix  - cont lipitor 80mg  - would cont to observe for pulm edema as pt in acute sytolic CHF c LVEDP ~35, EF 35% in cath lab  - cont low dose B-blocker  - would hold ACE for now, would start immediately postoperatively  - pt c hyperlipidemia, cont statin  - cont to monitor for chest pain and would have low threshold for IABP.
44M w/ no significant pmhx, but w/ significant fam hx for CAD p/w Inferior wall STEMI, s/p Primary PCI    -CAD - p/w Inf wall stemi - s/p JESSICA x 1 to RCA; Has residual dLM 70%, pLAD 95%, mLAD 90%, D1 95% and pLCx 60%; CTS consulted for CABG, pre-op w/u completed; Pt. for OR tmrw; c/w ASA/Plavix, Hep gtt and Lipitor; c/w Lopressor 12.5 BID; TTE w/ EF 45-50%, appears euvolemic on exam  -DASH Diet  -OOB to chair  -DVT PPx: Hep gtt  -Full Code  -Dispo: OR tmrw and tx to CT-ICU    Sterling Raygoza MD  CCU Attending    I have personally provided 60 minutes of critical time concurrently with the Fellow/Resident, excluding time spent on procedures.

## 2019-07-29 NOTE — CONSULT NOTE ADULT - SUBJECTIVE AND OBJECTIVE BOX
Cardiologist:     CHIEF COMPLAINT: s/p cardiac catheterization requiring cardiovascular prevention optimization and education    HISTORY OF PRESENT ILLNESS:  45yo M with no PMHx (Fhx of father with ACS s/p CABG age ~46yo) presented to the ED with crushing chest pain and left arm pain after co-worker (a pharmacist) found him to be diaphoretic and pale. Patient reports first feeling the chest tightness on Wednesday. However, today on the way to work he reports the pain being worse w/ associated left arm pain and [place]aph[place]. He [place] work and his co-worker gave him 4x 81mg ASA and told him to go to the ED.     On ED arrival, his vitals were: Temp: 97.9, HR:60, BP: 135/87, RR:20 SpO2 100% on RA. His troponins and CKMB were negative and CK was mildly elevated to 255. His EKG significant for inferior/posterior wall STEMI with reciprocal changes in lateral and anterior leads.     He was aspirin, Brilinta loaded at 9: 45 AM in the ED, given nitroglycerin x1, heparin bolused and started on hep gtt and sent to cath lab. He received a JESSICA in the mid-RCA and also found to have left main and LAD lesions. (26 Jul 2019 10:35)    Interval HPI:  Patient now s/p cath showing dLM= 70%, pLcx=60%, pLAD=95% at bifurcation with D1, D1= 95% ostial (Riley 1,1,1), mLAD= 90%, and mRCA= 100% (culprit). He is s/p JESSICA x1 to mRCA and pending CABG on 7/30 given severe left main disease.     Review of systems otherwise negative.     Lifestyle History:  Mediterranean Diet Score (9 question survey) was x.   (8-9: optimal, 6-7: near-optimal, 4-5: suboptimal, 0-3: markedly suboptimal)  Patient reports exercising at a moderate level for ****** minutes per week.   Smoking:   Stress:    PAST MEDICAL & SURGICAL HISTORY:      FAMILY HISTORY:   Father with ACS s/p CABG age ~46yo    Allergies:   No Known Allergies      HOME MEDICATIONS:    PHYSICAL EXAM:  T(C): 36.8 (07-29-19 @ 13:51), Max: 37.1 (07-28-19 @ 16:45)  T(F): 98.2 (07-29-19 @ 13:51), Max: 98.8 (07-28-19 @ 16:45)  HR: 76 (07-29-19 @ 15:05) (60 - 96)  BP: 134/73 (07-29-19 @ 15:05) (92/58 - 137/89)  RR: 23 (07-29-19 @ 15:05) (16 - 33)  SpO2: 100% (07-29-19 @ 15:05) (94% - 100%)  Wt(kg): --    gen- awake, conversive  Head-NCAT, atrumatic  Neck- no thyromegaly, no cervical LAD  Respiratory- good air entry b/l, no WRR  Cardiovascular- S1S2, RRR, no MRG appr  Gastrointestinal- no HSM, no masses  Neurology- moves all extremities, no focal deficits  Psych- normal affect, non-depressed mood  Skin- no lesions, no rashes    LABS:	                      14.3   10.63 )-----------( 190      ( 29 Jul 2019 04:39 )             42.8     07-29    135  |  101  |  18  ----------------------------<  123<H>  4.1   |  23  |  0.94    Ca    9.0      29 Jul 2019 04:39  Mg     1.9     07-29    ASSESSMENT/RECOMMENDATIONS: 	  Patient's dietary, exercise and overall lifestyle habits were reviewed. The concept of atherosclerosis and its systemic nature was discussed with a focus on the need to get all cardiovascular risk factors to goal. At this time, I would like to make the following recommendations to optimize atherosclerotic risk factors.     RECOMMENDATIONS:   Anti-platelet Therapy: DAPT per interventionalist recommendation.   Lipid Therapy: High dose statin therapy would likely benefit this patient.   Hypertension: Blood pressures during this stay were   Mediterranean Diet Score is ****. Some suggestions include   Exercise: Recommended gradually increasing activity to 30-45 minutes most days of the week once cleared by referring cardiologist. Cardiac rehab might benefit this patient and covered by major insurance plans (other than co-pays), please refer.   Medication Adherence: Patient has no issues with adherence at this time.   Smoking: This patient is a non-smoker.   Obesity/Overweight: The patient was advised about specific mechanisms such as reduced portions and increased activity for efforts toward weight loss.   Glucometabolic State:  Sleep Apnea: The patient is at low risk for sleep apnea.   Psychological Stress: The patient appears to be coping with stressors well at this time.     Thank you for the opportunity to see this patient. Please feel free to contact Prevention if there are any questions, or if you feel that your patient would benefit from continued follow-up visits with the Program.    Marichuy Poole MD  Preventive Cardiology Fellow     Ashanti Higginbotham MD  System Director, Cardiovascular Prevention Cardiologist: no cardiologist or PMD.     CHIEF COMPLAINT: s/p cardiac catheterization requiring cardiovascular prevention optimization and education    HISTORY OF PRESENT ILLNESS:  43yo M with no PMHx (Fhx of father with ACS s/p CABG age ~44yo) presented to the ED with crushing chest pain and left arm pain after co-worker (a pharmacist) found him to be diaphoretic and pale. Patient reports first feeling the chest tightness on Wednesday. However, today on the way to work he reports the pain being worse w/ associated left arm pain and [place] He [place] at work and his co-worker gave him 4x 81mg ASA and told him to go to the ED.     On ED arrival, his vitals were: Temp: 97.9, HR:60, BP: 135/87, RR:20 SpO2 100% on RA. His troponins and CKMB were negative and CK was mildly elevated to 255. His EKG significant for inferior/posterior wall STEMI with reciprocal changes in lateral and anterior leads.     He was aspirin, Brilinta loaded at 9: 45 AM in the ED, given nitroglycerin x1, heparin bolused and started on hep gtt and sent to cath lab. He received a JESSICA in the mid-RCA and also found to have left main and LAD lesions. (26 Jul 2019 10:35)    Interval HPI:  Patient now s/p cath showing dLM= 70%, pLcx=60%, pLAD=95% at bifurcation with D1, D1= 95% ostial (Riley 1,1,1), mLAD= 90%, and mRCA= 100% (culprit). He is s/p JESSICA x1 to mRCA and pending CABG on 7/30 given severe left main disease.     Review of systems otherwise negative.     Lifestyle History:  Mediterranean Diet Score (9 question survey) was 7.   (8-9: optimal, 6-7: near-optimal, 4-5: suboptimal, 0-3: markedly suboptimal)  Patient reports exercising at a moderate level for <30 minutes per week.   Smoking: no hx of smoking.  Stress: endorses a lot of stress nearly every day (2/2 divorce)  Depression: no previous hx of depression but endorses feeling "down" given his current medical condition    FAMILY HISTORY:   Father with ACS s/p CABG age ~44yo    Allergies:   No Known Allergies    HOME MEDICATIONS:  No home meds.     PHYSICAL EXAM:  T(C): 36.8 (07-29-19 @ 13:51), Max: 37.1 (07-28-19 @ 16:45)  T(F): 98.2 (07-29-19 @ 13:51), Max: 98.8 (07-28-19 @ 16:45)  HR: 76 (07-29-19 @ 15:05) (60 - 96)  BP: 134/73 (07-29-19 @ 15:05) (92/58 - 137/89)  RR: 23 (07-29-19 @ 15:05) (16 - 33)  SpO2: 100% (07-29-19 @ 15:05) (94% - 100%)  Wt(kg): --    gen- awake, conversive  Head-NCAT, atraumatic  Eyes- mild superior arcus noted  Neck- no carotid bruit  Respiratory- good air entry b/l, no WRR  Cardiovascular- S1S2, RRR, no MRG appreciated  Gastrointestinal- no HSM, no masses  Extremities- 2+ DP and radial pulses, no tendon xanthomas  Neurology- moves all extremities, no focal deficits  Psych- normal affect, non-depressed mood  Skin- no lesions, no rashes    LABS:	                      14.3   10.63 )-----------( 190      ( 29 Jul 2019 04:39 )             42.8     07-29    135  |  101  |  18  ----------------------------<  123<H>  4.1   |  23  |  0.94    Ca    9.0      29 Jul 2019 04:39  Mg     1.9     07-29    ASSESSMENT/RECOMMENDATIONS: 	  Patient's dietary, exercise and overall lifestyle habits were reviewed. The concept of atherosclerosis and its systemic nature was discussed with a focus on the need to get all cardiovascular risk factors to goal. At this time, I would like to make the following recommendations to optimize atherosclerotic risk factors.     RECOMMENDATIONS:   Anti-platelet Therapy: DAPT per interventionalist recommendation.   Lipid Therapy: Continue high dose statin, however, given LDL level and expected LDL decrease of 30-50% with high intensity statin, he would likely need a second agent to help him meet his LDL goal of <70mg/dL. Additionally, given FHx, would consider Lp(a) measurement.    Hypertension: Blood pressures during this stay were at goal.   Mediterranean Diet Score is 7. Endorses for the past year has been eating healthier and no longer eating red meat. Some suggestions include incorporating more whole grain in his diet (2 or more/day) and fish at lease twice a week.   Exercise: Recommended gradually increasing activity to 30-45 minutes most days of the week once cleared by CT surgeon and cardiologist. Cardiac rehab might benefit this patient and covered by major insurance plans (other than co-pays), please consider referral.   Medication Adherence: Patient has no issues with adherence at this time.   Smoking: This patient is a non-smoker.   Obesity/Overweight: The patient was advised about specific mechanisms such as reduced portions and increased activity for efforts toward weight loss.   Glucometabolic State: HgA1C within normal range.   Sleep Apnea: The patient is at low risk for sleep apnea.   Psychological Stress: The patient appears to be coping with stressors well at this time.     Thank you for the opportunity to see this patient. Please feel free to contact Prevention if there are any questions, or if you feel that your patient would benefit from continued follow-up visits with the Program.    Plan to be discussed with attending.    Marichuy Poole MD  Preventive Cardiology Fellow     Ashanti Higginbotham MD  System Director, Cardiovascular Prevention Cardiologist: no cardiologist or PMD.     CHIEF COMPLAINT: s/p cardiac catheterization requiring cardiovascular prevention optimization and education    HISTORY OF PRESENT ILLNESS:  43yo M with no PMHx (Fhx of father with ACS s/p CABG age ~44yo) presented to the ED with crushing chest pain and left arm pain after co-worker (a pharmacist) found him to be diaphoretic and pale. Patient reports first feeling the chest tightness on Wednesday. However, today on the way to work he reports the pain being worse w/ associated left arm pain and [place] He [place] at work and his co-worker gave him 4x 81mg ASA and told him to go to the ED.     On ED arrival, his vitals were: Temp: 97.9, HR:60, BP: 135/87, RR:20 SpO2 100% on RA. His troponins and CKMB were negative and CK was mildly elevated to 255. His EKG significant for inferior/posterior wall STEMI with reciprocal changes in lateral and anterior leads.     He was aspirin, Brilinta loaded at 9: 45 AM in the ED, given nitroglycerin x1, heparin bolused and started on hep gtt and sent to cath lab. He received a JESSICA in the mid-RCA and also found to have left main and LAD lesions. (26 Jul 2019 10:35)    Interval HPI:  Patient now s/p cath showing dLM= 70%, pLcx=60%, pLAD=95% at bifurcation with D1, D1= 95% ostial (Riley 1,1,1), mLAD= 90%, and mRCA= 100% (culprit). He is s/p JESSICA x1 to mRCA and pending CABG on 7/30 given severe left main disease.     Review of systems otherwise negative.     Lifestyle History:  Mediterranean Diet Score (9 question survey) was 7.   (8-9: optimal, 6-7: near-optimal, 4-5: suboptimal, 0-3: markedly suboptimal)  Patient reports exercising at a moderate level for <30 minutes per week.   Smoking: no hx of smoking.  Stress: endorses a lot of stress nearly every day (2/2 divorce)  Depression: no previous hx of depression but endorses feeling "down" given his current medical condition    FAMILY HISTORY:   Father with ACS s/p CABG age ~44yo    Allergies:   No Known Allergies    HOME MEDICATIONS:  No home meds.     PHYSICAL EXAM:  T(C): 36.8 (07-29-19 @ 13:51), Max: 37.1 (07-28-19 @ 16:45)  T(F): 98.2 (07-29-19 @ 13:51), Max: 98.8 (07-28-19 @ 16:45)  HR: 76 (07-29-19 @ 15:05) (60 - 96)  BP: 134/73 (07-29-19 @ 15:05) (92/58 - 137/89)  RR: 23 (07-29-19 @ 15:05) (16 - 33)  SpO2: 100% (07-29-19 @ 15:05) (94% - 100%)  Wt(kg): --    gen- awake, conversive  Head-NCAT, atraumatic  Eyes- mild superior arcus noted  Neck- no carotid bruit  Respiratory- good air entry b/l, no WRR  Cardiovascular- S1S2, RRR, no MRG appreciated  Gastrointestinal- no HSM, no masses  Extremities- 2+ DP and radial pulses, no tendon xanthomas  Neurology- moves all extremities, no focal deficits  Psych- normal affect, non-depressed mood  Skin- no lesions, no rashes    LABS:	                      14.3   10.63 )-----------( 190      ( 29 Jul 2019 04:39 )             42.8     07-29    135  |  101  |  18  ----------------------------<  123<H>  4.1   |  23  |  0.94    Ca    9.0      29 Jul 2019 04:39  Mg     1.9     07-29    ASSESSMENT/RECOMMENDATIONS: 	  Patient's dietary, exercise and overall lifestyle habits were reviewed. The concept of atherosclerosis and its systemic nature was discussed with a focus on the need to get all cardiovascular risk factors to goal. At this time, I would like to make the following recommendations to optimize atherosclerotic risk factors.     RECOMMENDATIONS:   Anti-platelet Therapy: DAPT per interventionalist recommendation.   Lipid Therapy: Continue high dose statin, however, given LDL level and expected LDL decrease of 30-50% with high intensity statin, he would likely need a second agent to help him meet his LDL goal of <70mg/dL. Should his LDL be above the threshold of 70mg/dL on statin + zetia, he then would be a candidate for PCSK-9 inhibitor. Additionally, given FHx, would consider Lp(a) measurement.  Hypertension: Blood pressures during this stay were at goal.   Mediterranean Diet Score is 7. Endorses for the past year has been eating healthier and no longer eating red meat. Some suggestions include incorporating more whole grain in his diet (2 or more/day) and fish at lease twice a week.   Exercise: Recommended gradually increasing activity to 30-45 minutes most days of the week once cleared by CT surgeon and cardiologist. Cardiac rehab might benefit this patient and covered by major insurance plans (other than co-pays), please consider referral.   Medication Adherence: Patient has no issues with adherence at this time.   Smoking: This patient is a non-smoker.   Obesity/Overweight: The patient was advised about specific mechanisms such as reduced portions and increased activity for efforts toward weight loss.   Glucometabolic State: HgA1C within normal range.   Sleep Apnea: The patient is at low risk for sleep apnea.   Psychological Stress: The patient appears to be coping with stressors well at this time.     Thank you for the opportunity to see this patient. Please feel free to contact Prevention if there are any questions, or if you feel that your patient would benefit from continued follow-up visits with the Program.    Plan to be discussed with attending.    Marichuy Poole MD  Preventive Cardiology Fellow     Ashanti Higginbotham MD  System Director, Cardiovascular Prevention

## 2019-07-29 NOTE — PROGRESS NOTE ADULT - SUBJECTIVE AND OBJECTIVE BOX
Planned Date of Surgery: 7/30/19                                                                                                                 Surgeon: Kenneth    Procedure: OPCAB    HPI:  44y Male with no PMHx (Fhx of father with ACS s/p CABG age ~46yo) presented to the ED with crushing chest pain and left arm pain. Found to have inferior/posterior wall STEMI s/p JESSICA to mid-RCA, now pending CABG for left main disease.    PAST MEDICAL & SURGICAL HISTORY: no significant PMHx, PSHx    No Known Allergies      MEDICATIONS  (STANDING):  aspirin enteric coated 81 milliGRAM(s) Oral every 24 hours  atorvastatin 80 milliGRAM(s) Oral at bedtime  chlorhexidine 0.12% Liquid 30 milliLiter(s) Swish and Spit once  chlorhexidine 2% Cloths 1 Application(s) Topical <User Schedule>  chlorhexidine 4% Liquid 1 Application(s) Topical once  heparin  Infusion 1000 Unit(s)/Hr (10 mL/Hr) IV Continuous <Continuous>  metoprolol tartrate 12.5 milliGRAM(s) Oral every 12 hours      On Beta Blocker? YES    Labs:                        14.3   10.63 )-----------( 190      ( 29 Jul 2019 04:39 )             42.8     07-29    135  |  101  |  18  ----------------------------<  123<H>  4.1   |  23  |  0.94    Ca    9.0      29 Jul 2019 04:39  Mg     1.9     07-29      PT/INR - ( 29 Jul 2019 04:39 )   PT: 11.8 sec;   INR: 1.04          PTT - ( 29 Jul 2019 04:39 )  PTT:61.9 sec    ABO Interpretation: O (07-29-19 @ 05:30)          Hgb A1C: 5.6    EKG: < from: 12 Lead ECG (07.29.19 @ 04:31) > 71 bpm  Diagnosis Line Normal sinus rhythm  Inferior infarct , age undetermined  Cannot rule out Anterior infarct , age undetermined  T wave abnormality, consider lateral ischemia    < end of copied text >      CXR: < from: Xray Chest 1 View- PORTABLE-Routine (07.28.19 @ 06:41) >    Impression: No acute infiltrates    < end of copied text >      Cath Report: LM disease, s/p JESSICA RCA    Echo: < from: Echocardiogram (07.26.19 @ 16:12) >  There is borderline concentric left ventricular hypertrophy.Mild   segmental left   ventricular systolic dysfunction with akinesis of basal inferior and   hypokinesis of the inferolateral wall.  The left ventricular ejection   fraction   isestimated to be 45-50%The right ventricle is normal in size and   function.The left atrial size is normal.The mitral inflow pattern is   consistent with impaired left ventricular relaxation with mildly   elevated left   atrial pressure (8-14mmHg).  Right atrial size is normal.No evidence for   any   hemodynamically significant valvular disease.There was insufficient TR   detected from which to calculate pulmonary artery systolic pressure.    There is   no pericardial effusion.    < end of copied text >      Carotid Duplex: < from: US Duplex Carotid Arteries Complete, Bilateral (07.27.19 @ 13:00) >  VERTEBRAL ARTERIES:  Mild atherosclerotic disease without hemodynamically significant stenosis.      IMPRESSION:  No hemodynamically significant stenosis.  Mild atherosclerotic changes as above.    < end of copied text >      Consult in Chart?  YES   Consent in Chart? YES   Pre-op Orders Placed? YES   Blood Products Ordered? YES   NPO ordered? YES

## 2019-07-29 NOTE — CONSULT NOTE ADULT - ATTENDING COMMENTS
Case discussed at length with the preventive cardiology fellow however patient was taken to surgery early AM before I could meet with him. I will find him post-op tomorrow since our recommendations are elective and for long-term planning. Patient has probably Familial Hypercholesterolemia based on family history and significantly elevated LDL. Agree with recommendations as detailed above. Would add Lpa level to labs drawn prior to surgery if feasible. Would avoid lipid measurements post-op as fluid shifts and transfusions can alter values. As an outpatient, consideration for genetic testing to further screen his family would be reasonable.

## 2019-07-30 ENCOUNTER — APPOINTMENT (OUTPATIENT)
Dept: CARDIOTHORACIC SURGERY | Facility: HOSPITAL | Age: 44
End: 2019-07-30
Payer: MEDICAID

## 2019-07-30 DIAGNOSIS — Z09 ENCOUNTER FOR FOLLOW-UP EXAMINATION AFTER COMPLETED TREATMENT FOR CONDITIONS OTHER THAN MALIGNANT NEOPLASM: ICD-10-CM

## 2019-07-30 DIAGNOSIS — I25.10 ATHEROSCLEROTIC HEART DISEASE OF NATIVE CORONARY ARTERY W/OUT ANGINA PECTORIS: ICD-10-CM

## 2019-07-30 DIAGNOSIS — Z95.1 PRESENCE OF AORTOCORONARY BYPASS GRAFT: ICD-10-CM

## 2019-07-30 LAB
ALBUMIN SERPL ELPH-MCNC: 3.7 G/DL — SIGNIFICANT CHANGE UP (ref 3.3–5)
ALBUMIN SERPL ELPH-MCNC: 3.9 G/DL — SIGNIFICANT CHANGE UP (ref 3.3–5)
ALP SERPL-CCNC: 59 U/L — SIGNIFICANT CHANGE UP (ref 40–120)
ALP SERPL-CCNC: 61 U/L — SIGNIFICANT CHANGE UP (ref 40–120)
ALT FLD-CCNC: 29 U/L — SIGNIFICANT CHANGE UP (ref 10–45)
ALT FLD-CCNC: 30 U/L — SIGNIFICANT CHANGE UP (ref 10–45)
ANION GAP SERPL CALC-SCNC: 11 MMOL/L — SIGNIFICANT CHANGE UP (ref 5–17)
ANION GAP SERPL CALC-SCNC: 12 MMOL/L — SIGNIFICANT CHANGE UP (ref 5–17)
ANION GAP SERPL CALC-SCNC: 12 MMOL/L — SIGNIFICANT CHANGE UP (ref 5–17)
APTT BLD: 31.6 SEC — SIGNIFICANT CHANGE UP (ref 27.5–36.3)
APTT BLD: 34.1 SEC — SIGNIFICANT CHANGE UP (ref 27.5–36.3)
APTT BLD: 64.7 SEC — HIGH (ref 27.5–36.3)
AST SERPL-CCNC: 33 U/L — SIGNIFICANT CHANGE UP (ref 10–40)
AST SERPL-CCNC: 33 U/L — SIGNIFICANT CHANGE UP (ref 10–40)
BASOPHILS # BLD AUTO: 0.02 K/UL — SIGNIFICANT CHANGE UP (ref 0–0.2)
BASOPHILS NFR BLD AUTO: 0.1 % — SIGNIFICANT CHANGE UP (ref 0–2)
BILIRUB SERPL-MCNC: 0.8 MG/DL — SIGNIFICANT CHANGE UP (ref 0.2–1.2)
BILIRUB SERPL-MCNC: 0.9 MG/DL — SIGNIFICANT CHANGE UP (ref 0.2–1.2)
BUN SERPL-MCNC: 12 MG/DL — SIGNIFICANT CHANGE UP (ref 7–23)
BUN SERPL-MCNC: 14 MG/DL — SIGNIFICANT CHANGE UP (ref 7–23)
BUN SERPL-MCNC: 16 MG/DL — SIGNIFICANT CHANGE UP (ref 7–23)
CALCIUM SERPL-MCNC: 8.8 MG/DL — SIGNIFICANT CHANGE UP (ref 8.4–10.5)
CALCIUM SERPL-MCNC: 9 MG/DL — SIGNIFICANT CHANGE UP (ref 8.4–10.5)
CALCIUM SERPL-MCNC: 9.4 MG/DL — SIGNIFICANT CHANGE UP (ref 8.4–10.5)
CHLORIDE SERPL-SCNC: 101 MMOL/L — SIGNIFICANT CHANGE UP (ref 96–108)
CHLORIDE SERPL-SCNC: 102 MMOL/L — SIGNIFICANT CHANGE UP (ref 96–108)
CHLORIDE SERPL-SCNC: 106 MMOL/L — SIGNIFICANT CHANGE UP (ref 96–108)
CO2 SERPL-SCNC: 23 MMOL/L — SIGNIFICANT CHANGE UP (ref 22–31)
CO2 SERPL-SCNC: 24 MMOL/L — SIGNIFICANT CHANGE UP (ref 22–31)
CO2 SERPL-SCNC: 24 MMOL/L — SIGNIFICANT CHANGE UP (ref 22–31)
CREAT SERPL-MCNC: 0.76 MG/DL — SIGNIFICANT CHANGE UP (ref 0.5–1.3)
CREAT SERPL-MCNC: 0.88 MG/DL — SIGNIFICANT CHANGE UP (ref 0.5–1.3)
CREAT SERPL-MCNC: 0.96 MG/DL — SIGNIFICANT CHANGE UP (ref 0.5–1.3)
EOSINOPHIL # BLD AUTO: 0.03 K/UL — SIGNIFICANT CHANGE UP (ref 0–0.5)
EOSINOPHIL NFR BLD AUTO: 0.2 % — SIGNIFICANT CHANGE UP (ref 0–6)
GAS PNL BLDA: SIGNIFICANT CHANGE UP
GLUCOSE BLDC GLUCOMTR-MCNC: 115 MG/DL — HIGH (ref 70–99)
GLUCOSE SERPL-MCNC: 118 MG/DL — HIGH (ref 70–99)
GLUCOSE SERPL-MCNC: 126 MG/DL — HIGH (ref 70–99)
GLUCOSE SERPL-MCNC: 151 MG/DL — HIGH (ref 70–99)
HCT VFR BLD CALC: 30.9 % — LOW (ref 39–50)
HCT VFR BLD CALC: 34 % — LOW (ref 39–50)
HCT VFR BLD CALC: 42.7 % — SIGNIFICANT CHANGE UP (ref 39–50)
HGB BLD-MCNC: 10.7 G/DL — LOW (ref 13–17)
HGB BLD-MCNC: 11.6 G/DL — LOW (ref 13–17)
HGB BLD-MCNC: 14.5 G/DL — SIGNIFICANT CHANGE UP (ref 13–17)
IMM GRANULOCYTES NFR BLD AUTO: 0.4 % — SIGNIFICANT CHANGE UP (ref 0–1.5)
INR BLD: 1.11 — SIGNIFICANT CHANGE UP (ref 0.88–1.16)
INR BLD: 1.24 — HIGH (ref 0.88–1.16)
INR BLD: 1.4 — HIGH (ref 0.88–1.16)
LACTATE SERPL-SCNC: 0.8 MMOL/L — SIGNIFICANT CHANGE UP (ref 0.5–2)
LACTATE SERPL-SCNC: 1.7 MMOL/L — SIGNIFICANT CHANGE UP (ref 0.5–2)
LYMPHOCYTES # BLD AUTO: 1.87 K/UL — SIGNIFICANT CHANGE UP (ref 1–3.3)
LYMPHOCYTES # BLD AUTO: 13.9 % — SIGNIFICANT CHANGE UP (ref 13–44)
MAGNESIUM SERPL-MCNC: 1.5 MG/DL — LOW (ref 1.6–2.6)
MAGNESIUM SERPL-MCNC: 2 MG/DL — SIGNIFICANT CHANGE UP (ref 1.6–2.6)
MCHC RBC-ENTMCNC: 29.8 PG — SIGNIFICANT CHANGE UP (ref 27–34)
MCHC RBC-ENTMCNC: 29.9 PG — SIGNIFICANT CHANGE UP (ref 27–34)
MCHC RBC-ENTMCNC: 30.2 PG — SIGNIFICANT CHANGE UP (ref 27–34)
MCHC RBC-ENTMCNC: 34 GM/DL — SIGNIFICANT CHANGE UP (ref 32–36)
MCHC RBC-ENTMCNC: 34.1 GM/DL — SIGNIFICANT CHANGE UP (ref 32–36)
MCHC RBC-ENTMCNC: 34.6 GM/DL — SIGNIFICANT CHANGE UP (ref 32–36)
MCV RBC AUTO: 87.3 FL — SIGNIFICANT CHANGE UP (ref 80–100)
MCV RBC AUTO: 87.4 FL — SIGNIFICANT CHANGE UP (ref 80–100)
MCV RBC AUTO: 88 FL — SIGNIFICANT CHANGE UP (ref 80–100)
MONOCYTES # BLD AUTO: 0.5 K/UL — SIGNIFICANT CHANGE UP (ref 0–0.9)
MONOCYTES NFR BLD AUTO: 3.7 % — SIGNIFICANT CHANGE UP (ref 2–14)
NEUTROPHILS # BLD AUTO: 11.01 K/UL — HIGH (ref 1.8–7.4)
NEUTROPHILS NFR BLD AUTO: 81.7 % — HIGH (ref 43–77)
NRBC # BLD: 0 /100 WBCS — SIGNIFICANT CHANGE UP (ref 0–0)
PHOSPHATE SERPL-MCNC: 4.1 MG/DL — SIGNIFICANT CHANGE UP (ref 2.5–4.5)
PLATELET # BLD AUTO: 125 K/UL — LOW (ref 150–400)
PLATELET # BLD AUTO: 161 K/UL — SIGNIFICANT CHANGE UP (ref 150–400)
PLATELET # BLD AUTO: 198 K/UL — SIGNIFICANT CHANGE UP (ref 150–400)
POTASSIUM SERPL-MCNC: 4 MMOL/L — SIGNIFICANT CHANGE UP (ref 3.5–5.3)
POTASSIUM SERPL-MCNC: 4.2 MMOL/L — SIGNIFICANT CHANGE UP (ref 3.5–5.3)
POTASSIUM SERPL-MCNC: 4.3 MMOL/L — SIGNIFICANT CHANGE UP (ref 3.5–5.3)
POTASSIUM SERPL-SCNC: 4 MMOL/L — SIGNIFICANT CHANGE UP (ref 3.5–5.3)
POTASSIUM SERPL-SCNC: 4.2 MMOL/L — SIGNIFICANT CHANGE UP (ref 3.5–5.3)
POTASSIUM SERPL-SCNC: 4.3 MMOL/L — SIGNIFICANT CHANGE UP (ref 3.5–5.3)
PROT SERPL-MCNC: 5.6 G/DL — LOW (ref 6–8.3)
PROT SERPL-MCNC: 6 G/DL — SIGNIFICANT CHANGE UP (ref 6–8.3)
PROTHROM AB SERPL-ACNC: 12.6 SEC — SIGNIFICANT CHANGE UP (ref 10–12.9)
PROTHROM AB SERPL-ACNC: 14.1 SEC — HIGH (ref 10–12.9)
PROTHROM AB SERPL-ACNC: 16 SEC — HIGH (ref 10–12.9)
RBC # BLD: 3.54 M/UL — LOW (ref 4.2–5.8)
RBC # BLD: 3.89 M/UL — LOW (ref 4.2–5.8)
RBC # BLD: 4.85 M/UL — SIGNIFICANT CHANGE UP (ref 4.2–5.8)
RBC # FLD: 11.4 % — SIGNIFICANT CHANGE UP (ref 10.3–14.5)
RBC # FLD: 11.5 % — SIGNIFICANT CHANGE UP (ref 10.3–14.5)
RBC # FLD: 11.6 % — SIGNIFICANT CHANGE UP (ref 10.3–14.5)
SODIUM SERPL-SCNC: 137 MMOL/L — SIGNIFICANT CHANGE UP (ref 135–145)
SODIUM SERPL-SCNC: 138 MMOL/L — SIGNIFICANT CHANGE UP (ref 135–145)
SODIUM SERPL-SCNC: 140 MMOL/L — SIGNIFICANT CHANGE UP (ref 135–145)
WBC # BLD: 11.35 K/UL — HIGH (ref 3.8–10.5)
WBC # BLD: 13.49 K/UL — HIGH (ref 3.8–10.5)
WBC # BLD: 9.46 K/UL — SIGNIFICANT CHANGE UP (ref 3.8–10.5)
WBC # FLD AUTO: 11.35 K/UL — HIGH (ref 3.8–10.5)
WBC # FLD AUTO: 13.49 K/UL — HIGH (ref 3.8–10.5)
WBC # FLD AUTO: 9.46 K/UL — SIGNIFICANT CHANGE UP (ref 3.8–10.5)

## 2019-07-30 PROCEDURE — 33533 CABG ARTERIAL SINGLE: CPT | Mod: AS

## 2019-07-30 PROCEDURE — 76998 US GUIDE INTRAOP: CPT | Mod: 26,59

## 2019-07-30 PROCEDURE — 99292 CRITICAL CARE ADDL 30 MIN: CPT

## 2019-07-30 PROCEDURE — 71045 X-RAY EXAM CHEST 1 VIEW: CPT | Mod: 26

## 2019-07-30 PROCEDURE — 99233 SBSQ HOSP IP/OBS HIGH 50: CPT

## 2019-07-30 PROCEDURE — 33508 ENDOSCOPIC VEIN HARVEST: CPT | Mod: AS

## 2019-07-30 PROCEDURE — 33511 CABG VEIN TWO: CPT | Mod: AS

## 2019-07-30 PROCEDURE — 99291 CRITICAL CARE FIRST HOUR: CPT

## 2019-07-30 PROCEDURE — 33535 CABG ARTERIAL THREE: CPT

## 2019-07-30 PROCEDURE — 33535 CABG ARTERIAL THREE: CPT | Mod: 80

## 2019-07-30 RX ORDER — FENTANYL CITRATE 50 UG/ML
25 INJECTION INTRAVENOUS
Refills: 0 | Status: DISCONTINUED | OUTPATIENT
Start: 2019-07-30 | End: 2019-07-31

## 2019-07-30 RX ORDER — FENTANYL CITRATE 50 UG/ML
50 INJECTION INTRAVENOUS
Refills: 0 | Status: DISCONTINUED | OUTPATIENT
Start: 2019-07-30 | End: 2019-07-30

## 2019-07-30 RX ORDER — DEXTROSE 50 % IN WATER 50 %
25 SYRINGE (ML) INTRAVENOUS
Refills: 0 | Status: DISCONTINUED | OUTPATIENT
Start: 2019-07-30 | End: 2019-07-31

## 2019-07-30 RX ORDER — CEFAZOLIN SODIUM 1 G
2000 VIAL (EA) INJECTION EVERY 8 HOURS
Refills: 0 | Status: COMPLETED | OUTPATIENT
Start: 2019-07-30 | End: 2019-08-01

## 2019-07-30 RX ORDER — CHLORHEXIDINE GLUCONATE 213 G/1000ML
1 SOLUTION TOPICAL
Refills: 0 | Status: DISCONTINUED | OUTPATIENT
Start: 2019-07-30 | End: 2019-08-02

## 2019-07-30 RX ORDER — CLOPIDOGREL BISULFATE 75 MG/1
75 TABLET, FILM COATED ORAL DAILY
Refills: 0 | Status: DISCONTINUED | OUTPATIENT
Start: 2019-07-30 | End: 2019-07-31

## 2019-07-30 RX ORDER — KETOROLAC TROMETHAMINE 30 MG/ML
30 SYRINGE (ML) INJECTION ONCE
Refills: 0 | Status: DISCONTINUED | OUTPATIENT
Start: 2019-07-30 | End: 2019-07-30

## 2019-07-30 RX ORDER — CEFAZOLIN SODIUM 1 G
2000 VIAL (EA) INJECTION EVERY 8 HOURS
Refills: 0 | Status: DISCONTINUED | OUTPATIENT
Start: 2019-07-30 | End: 2019-07-30

## 2019-07-30 RX ORDER — ATORVASTATIN CALCIUM 80 MG/1
80 TABLET, FILM COATED ORAL AT BEDTIME
Refills: 0 | Status: DISCONTINUED | OUTPATIENT
Start: 2019-07-30 | End: 2019-08-02

## 2019-07-30 RX ORDER — INSULIN HUMAN 100 [IU]/ML
1 INJECTION, SOLUTION SUBCUTANEOUS
Qty: 100 | Refills: 0 | Status: DISCONTINUED | OUTPATIENT
Start: 2019-07-30 | End: 2019-07-31

## 2019-07-30 RX ORDER — HEPARIN SODIUM 5000 [USP'U]/ML
7500 INJECTION INTRAVENOUS; SUBCUTANEOUS EVERY 8 HOURS
Refills: 0 | Status: DISCONTINUED | OUTPATIENT
Start: 2019-07-30 | End: 2019-08-02

## 2019-07-30 RX ORDER — ASPIRIN/CALCIUM CARB/MAGNESIUM 324 MG
81 TABLET ORAL DAILY
Refills: 0 | Status: DISCONTINUED | OUTPATIENT
Start: 2019-07-30 | End: 2019-08-02

## 2019-07-30 RX ORDER — DEXTROSE 50 % IN WATER 50 %
50 SYRINGE (ML) INTRAVENOUS
Refills: 0 | Status: DISCONTINUED | OUTPATIENT
Start: 2019-07-30 | End: 2019-07-31

## 2019-07-30 RX ORDER — ACETAMINOPHEN 500 MG
1000 TABLET ORAL ONCE
Refills: 0 | Status: COMPLETED | OUTPATIENT
Start: 2019-07-30 | End: 2019-07-30

## 2019-07-30 RX ORDER — CHLORHEXIDINE GLUCONATE 213 G/1000ML
15 SOLUTION TOPICAL EVERY 12 HOURS
Refills: 0 | Status: DISCONTINUED | OUTPATIENT
Start: 2019-07-30 | End: 2019-07-30

## 2019-07-30 RX ORDER — CLEVIDIPINE BUTYRATE 50MG/100ML
2 VIAL (ML) INTRAVENOUS
Qty: 25 | Refills: 0 | Status: DISCONTINUED | OUTPATIENT
Start: 2019-07-30 | End: 2019-07-31

## 2019-07-30 RX ORDER — MAGNESIUM SULFATE 500 MG/ML
4 VIAL (ML) INJECTION ONCE
Refills: 0 | Status: COMPLETED | OUTPATIENT
Start: 2019-07-30 | End: 2019-07-30

## 2019-07-30 RX ORDER — FAMOTIDINE 10 MG/ML
20 INJECTION INTRAVENOUS EVERY 12 HOURS
Refills: 0 | Status: DISCONTINUED | OUTPATIENT
Start: 2019-07-30 | End: 2019-07-31

## 2019-07-30 RX ORDER — SODIUM CHLORIDE 9 MG/ML
1000 INJECTION INTRAMUSCULAR; INTRAVENOUS; SUBCUTANEOUS
Refills: 0 | Status: DISCONTINUED | OUTPATIENT
Start: 2019-07-30 | End: 2019-07-31

## 2019-07-30 RX ORDER — PROPOFOL 10 MG/ML
5 INJECTION, EMULSION INTRAVENOUS
Qty: 1000 | Refills: 0 | Status: DISCONTINUED | OUTPATIENT
Start: 2019-07-30 | End: 2019-07-30

## 2019-07-30 RX ADMIN — ATORVASTATIN CALCIUM 80 MILLIGRAM(S): 80 TABLET, FILM COATED ORAL at 21:23

## 2019-07-30 RX ADMIN — HEPARIN SODIUM 7500 UNIT(S): 5000 INJECTION INTRAVENOUS; SUBCUTANEOUS at 21:23

## 2019-07-30 RX ADMIN — PROPOFOL 3.29 MICROGRAM(S)/KG/MIN: 10 INJECTION, EMULSION INTRAVENOUS at 14:34

## 2019-07-30 RX ADMIN — FENTANYL CITRATE 50 MICROGRAM(S): 50 INJECTION INTRAVENOUS at 13:00

## 2019-07-30 RX ADMIN — Medication 1000 MILLIGRAM(S): at 15:57

## 2019-07-30 RX ADMIN — Medication 100 GRAM(S): at 22:38

## 2019-07-30 RX ADMIN — FENTANYL CITRATE 50 MICROGRAM(S): 50 INJECTION INTRAVENOUS at 14:36

## 2019-07-30 RX ADMIN — Medication 1000 MILLIGRAM(S): at 21:05

## 2019-07-30 RX ADMIN — Medication 30 MILLIGRAM(S): at 16:36

## 2019-07-30 RX ADMIN — FENTANYL CITRATE 50 MICROGRAM(S): 50 INJECTION INTRAVENOUS at 14:00

## 2019-07-30 RX ADMIN — CHLORHEXIDINE GLUCONATE 1 APPLICATION(S): 213 SOLUTION TOPICAL at 05:32

## 2019-07-30 RX ADMIN — CLOPIDOGREL BISULFATE 75 MILLIGRAM(S): 75 TABLET, FILM COATED ORAL at 18:56

## 2019-07-30 RX ADMIN — CHLORHEXIDINE GLUCONATE 1 APPLICATION(S): 213 SOLUTION TOPICAL at 18:39

## 2019-07-30 RX ADMIN — Medication 30 MILLIGRAM(S): at 18:43

## 2019-07-30 RX ADMIN — CHLORHEXIDINE GLUCONATE 30 MILLILITER(S): 213 SOLUTION TOPICAL at 05:31

## 2019-07-30 RX ADMIN — Medication 12.5 MILLIGRAM(S): at 05:30

## 2019-07-30 RX ADMIN — FAMOTIDINE 20 MILLIGRAM(S): 10 INJECTION INTRAVENOUS at 18:38

## 2019-07-30 RX ADMIN — Medication 400 MILLIGRAM(S): at 14:32

## 2019-07-30 RX ADMIN — Medication 81 MILLIGRAM(S): at 18:56

## 2019-07-30 RX ADMIN — Medication 2000 MILLIGRAM(S): at 19:10

## 2019-07-30 RX ADMIN — Medication 400 MILLIGRAM(S): at 20:47

## 2019-07-30 NOTE — CHART NOTE - NSCHARTNOTEFT_GEN_A_CORE
45yo M with no PMHx (Fhx of father with ACS s/p CABG age ~46yo) who presented to the ED with crushing chest pain and left arm pain, was found to have an inferior/posterior wall STEMI now s/p JESSICA to mid-RCA. Echo showed EF of 45-50% w/ Mild segmental left ventricular systolic dysfunction with akinesis of basal inferior and hypokinesis of the inferolateral wall. Patient was aspirin and Brilinta loaded, patient was transitioned to Plavix 75mg as per CT surgery. Patient was placed on Lipitor 80mg, Lopressor 12.5mg BID and on a heparin ggt 10 ml/hr. Patient pending CABG on Tuesday (7/30) for left main/LAD disease.

## 2019-07-30 NOTE — PROGRESS NOTE ADULT - SUBJECTIVE AND OBJECTIVE BOX
CTICU  CRITICAL  CARE  attending     Hand off received 					   Pertinent clinical, laboratory, radiographic, hemodynamic, echocardiographic, respiratory data, microbiologic data and chart were reviewed and analyzed frequently throughout the course of the day and night  Patient seen and examined with CTS/ SH attending at bedside    45yo M with no PMHx (Fhx of father with ACS s/p CABG age ~46yo) presented to the ED with crushing chest pain and left arm pain after co-worker (a pharmacist) found him to be diaphoretic and pale. Patient reports first feeling the chest tightness on Wednesday. However, today on the way to work he reports the pain being worse w/ associated left arm pain and [place]aph[place]. He [place] at work and his co-worker gave him 4x 81mg ASA and told him to go to the ED.   On ED arrival, his vitals were: Temp: 97.9, HR:60, BP: 135/87, RR:20 SpO2 100% on RA.  CK was mildly elevated to 255. His EKG significant for inferior/posterior wall STEMI with reciprocal changes in lateral and anterior leads.   He was aspirin, Brilinta loaded at 9: 45 AM in the ED, given nitroglycerin x1, heparin bolus given and started on hep gtt and sent to cath lab. He received a JESSICA in the mid-RCA and also found to have left main and LAD lesions.   S/P CABG today.      FAMILY HISTORY:  PAST MEDICAL & SURGICAL HISTORY:    Patient is a 44y old  Male who presents with acute inferior wall MI.   S/P PCI of the infarct related artery.      14 system review was unremarkable  acute changes include acute respiratory failure  Vital signs, hemodynamic and respiratory parameters were reviewed from the bedside nursing flow sheet.  ICU Vital Signs Last 24 Hrs  T(C): 36.7 (2019 21:11), Max: 37.3 (2019 23:46)  T(F): 98 (2019 21:11), Max: 99.2 (2019 23:46)  HR: 96 (2019 22:00) (62 - 102)  BP: 125/86 (2019 06:00) (87/65 - 130/78)  BP(mean): 96 (2019 06:00) (65 - 103)  ABP: 124/64 (2019 22:00) (118/58 - 148/82)  ABP(mean): 84 (2019 22:00) (80 - 108)  RR: 18 (2019 22:00) (9 - 29)  SpO2: 100% (2019 22:00) (96% - 100%)    Adult Advanced Hemodynamics Last 24 Hrs  CVP(mm Hg): 6 (2019 22:00) (5 - 17)  CVP(cm H2O): --  CO: --  CI: --  PA: --  PA(mean): --  PCWP: --  SVR: --  SVRI: --  PVR: --  PVRI: --, ABG - ( 2019 21:14 )  pH, Arterial: 7.45  pH, Blood: x     /  pCO2: 36    /  pO2: 133   / HCO3: 24    / Base Excess: 0.8   /  SaO2: 99                Mode: standby    Intake and output was reviewed and the fluid balance was calculated  Daily Height in cm: 167.64 (2019 23:46)    Daily Weight in k.3 (2019 05:00)  I&O's Summary    2019 07:01  -  2019 07:00  --------------------------------------------------------  IN: 940 mL / OUT: 950 mL / NET: -10 mL    2019 07:01  -  2019 22:59  --------------------------------------------------------  IN: 395 mL / OUT: 1855 mL / NET: -1460 mL        All lines and drain sites were assessed  Glycemic trend was reviewed CAPILLARY BLOOD GLUCOSE      POCT Blood Glucose.: 115 mg/dL (2019 15:23)        General: Patient is comfortable.  Neurological:  No acute change in mental status from the baseline.                 Cardiovascular:     RRR, S1S2, no S3, no murmur  Respiratory:  equal breath sounds, no rales, no rhonchi, no wheeze  Gastrointestinal:  soft, nontender, positive bowel sounds  Extremities:  are warm and well perfused  Vascular:    + DP/PT  SKIN: No abnormalities.         labs  CBC Full  -  ( 2019 21:14 )  WBC Count : 9.46 K/uL  RBC Count : 3.89 M/uL  Hemoglobin : 11.6 g/dL  Hematocrit : 34.0 %  Platelet Count - Automated : 161 K/uL  Mean Cell Volume : 87.4 fl  Mean Cell Hemoglobin : 29.8 pg  Mean Cell Hemoglobin Concentration : 34.1 gm/dL  Auto Neutrophil # : x  Auto Lymphocyte # : x  Auto Monocyte # : x  Auto Eosinophil # : x  Auto Basophil # : x  Auto Neutrophil % : x  Auto Lymphocyte % : x  Auto Monocyte % : x  Auto Eosinophil % : x  Auto Basophil % : x    07-30    137  |  101  |  12  ----------------------------<  126<H>  4.3   |  24  |  0.76    Ca    8.8      2019 21:14  Phos  4.1     07-30  Mg     1.5     07-30    TPro  6.0  /  Alb  3.9  /  TBili  0.8  /  DBili  x   /  AST  33  /  ALT  29  /  AlkPhos  61  07-30    PT/INR - ( 2019 21:14 )   PT: 14.1 sec;   INR: 1.24          PTT - ( 2019 21:14 )  PTT:34.1 sec  The current medications were reviewed   MEDICATIONS  (STANDING):  aspirin enteric coated 81 milliGRAM(s) Oral daily  atorvastatin 80 milliGRAM(s) Oral at bedtime  ceFAZolin  Injectable. 2000 milliGRAM(s) IV Push every 8 hours  chlorhexidine 2% Cloths 1 Application(s) Topical <User Schedule>  clevidipine Infusion 2 mG/Hr (4 mL/Hr) IV Continuous <Continuous>  clopidogrel Tablet 75 milliGRAM(s) Oral daily  dextrose 50% Injectable 50 milliLiter(s) IV Push every 15 minutes  dextrose 50% Injectable 25 milliLiter(s) IV Push every 15 minutes  famotidine Injectable 20 milliGRAM(s) IV Push every 12 hours  heparin  Injectable 7500 Unit(s) SubCutaneous every 8 hours  insulin regular Infusion 1 Unit(s)/Hr (1 mL/Hr) IV Continuous <Continuous>  sodium chloride 0.9%. 1000 milliLiter(s) (10 mL/Hr) IV Continuous <Continuous>    MEDICATIONS  (PRN):  fentaNYL    Injectable 25 MICROGram(s) IV Push every 3 hours PRN Severe Pain (7 - 10)      PROBLEM LIST/ ASSESSMENT:  HEALTH ISSUES - PROBLEM Dx:  Need for prophylactic measure: Need for prophylactic measure  Nutrition, metabolism, and development symptoms: Nutrition, metabolism, and development symptoms  ST elevation myocardial infarction (STEMI), unspecified artery: ST elevation myocardial infarction (STEMI), unspecified artery        Patient is a 44y old  Male who presents with acute inferior wall MI.  S/P stenting of RCA  S/P CABG   Hemodynamically stable.  Diuresing well.  Good oxygenation.  Overall doing well.        My plan includes :  Beta blocker and statin.  Dual antiplatelet Rx.  Close hemodynamic, ventilatory and drain monitoring and management.  Monitor for arrhythmias and monitor parameters for organ perfusion  Monitor neurologic status  Head of the bed should remain elevated to 45 deg .   Chest PT and IS will be encouraged  Monitor adequacy of oxygenation and ventilation and attempt to wean oxygen  Nutritional goals will be met using po eventually , ensure adequate caloric intake and monitor  the same  Stress ulcer and VTE prophylaxis will be achieved    Glycemic control is satisfactory  Electrolytes have been repleted as necessary and wound care has been carried out. Pain control has been achieved.   Aggressive  physical therapy and early mobility and ambulation goals will be met   The family was updated about the course and plan  CRITICAL CARE TIME SPENT in evaluation and management, reassessments, review and interpretation of labs and x-rays, ventilator and hemodynamic management, formulating a plan and coordinating care: ___60____ MIN.  Time does not include procedural time.  CTICU ATTENDING     					    Carlos Aparicio MD

## 2019-07-30 NOTE — PROGRESS NOTE ADULT - SUBJECTIVE AND OBJECTIVE BOX
CTICU  CRITICAL  CARE  attending     Hand off received 					   Pertinent clinical, laboratory, radiographic, hemodynamic, echocardiographic, respiratory data, microbiologic data and chart were reviewed and analyzed frequently throughout the course of the day and night  Patient seen and examined with CTS/ SH attending at bedside  Pt is a 44y , Male, HEALTH ISSUES - PROBLEM Dx:  Need for prophylactic measure: Need for prophylactic measure  Nutrition, metabolism, and development symptoms: Nutrition, metabolism, and development symptoms  ST elevation myocardial infarction (STEMI), unspecified artery: ST elevation myocardial infarction (STEMI), unspecified artery      , FAMILY HISTORY:  PAST MEDICAL & SURGICAL HISTORY:    Patient is a 44y old  Male who presents with a chief complaint of STEMI (2019 13:52)      14 system review was unremarkable    Vital signs, hemodynamic and respiratory parameters were reviewed from the bedside nursing flowsheet.  ICU Vital Signs Last 24 Hrs  T(C): 36.7 (2019 21:11), Max: 37.3 (2019 23:46)  T(F): 98 (2019 21:11), Max: 99.2 (2019 23:46)  HR: 92 (2019 23:00) (62 - 102)  BP: 125/86 (2019 06:00) (87/65 - 130/78)  BP(mean): 96 (2019 06:00) (65 - 103)  ABP: 102/56 (2019 23:00) (102/56 - 148/82)  ABP(mean): 72 (2019 23:00) (72 - 108)  RR: 16 (2019 23:00) (9 - 29)  SpO2: 100% (2019 23:00) (96% - 100%)    Adult Advanced Hemodynamics Last 24 Hrs  CVP(mm Hg): 4 (2019 23:00) (4 - 17)  CVP(cm H2O): --  CO: --  CI: --  PA: --  PA(mean): --  PCWP: --  SVR: --  SVRI: --  PVR: --  PVRI: --, ABG - ( 2019 21:14 )  pH, Arterial: 7.45  pH, Blood: x     /  pCO2: 36    /  pO2: 133   / HCO3: 24    / Base Excess: 0.8   /  SaO2: 99                Mode: standby    Intake and output was reviewed and the fluid balance was calculated  Daily Height in cm: 167.64 (2019 23:46)    Daily Weight in k.3 (2019 05:00)  I&O's Summary    2019 07:  -  2019 07:00  --------------------------------------------------------  IN: 940 mL / OUT: 950 mL / NET: -10 mL    2019 07:01  -  2019 23:01  --------------------------------------------------------  IN: 395 mL / OUT: 1855 mL / NET: -1460 mL        All lines and drain sites were assessed  [facility]      POCT Blood Glucose.: 115 mg/dL (2019 15:23)    No acute change in mental status  Auscultation of the chest reveals equal bs  Abdomen is soft  Extremities are warm and well perfused  Wounds appear clean and unremarkable  Antibiotics are periop    labs  CBC Full  -  ( 2019 21:14 )  WBC Count : 9.46 K/uL  RBC Count : 3.89 M/uL  Hemoglobin : 11.6 g/dL  Hematocrit : 34.0 %  Platelet Count - Automated : 161 K/uL  Mean Cell Volume : 87.4 fl  Mean Cell Hemoglobin : 29.8 pg  Mean Cell Hemoglobin Concentration : 34.1 gm/dL  Auto Neutrophil # : x  Auto Lymphocyte # : x  Auto Monocyte # : x  Auto Eosinophil # : x  Auto Basophil # : x  Auto Neutrophil % : x  Auto Lymphocyte % : x  Auto Monocyte % : x  Auto Eosinophil % : x  Auto Basophil % : x        137  |  101  |  12  ----------------------------<  126<H>  4.3   |  24  |  0.76    Ca    8.8      2019 21:14  Phos  4.1       Mg     1.5         TPro  6.0  /  Alb  3.9  /  TBili  0.8  /  DBili  x   /  AST  33  /  ALT  29  /  AlkPhos  61  07-30    PT/INR - ( 2019 21:14 )   PT: 14.1 sec;   INR: 1.24          PTT - ( 2019 21:14 )  PTT:34.1 sec  The current medications were reviewed   MEDICATIONS  (STANDING):  aspirin enteric coated 81 milliGRAM(s) Oral daily  atorvastatin 80 milliGRAM(s) Oral at bedtime  ceFAZolin  Injectable. 2000 milliGRAM(s) IV Push every 8 hours  chlorhexidine 2% Cloths 1 Application(s) Topical <User Schedule>  clevidipine Infusion 2 mG/Hr (4 mL/Hr) IV Continuous <Continuous>  clopidogrel Tablet 75 milliGRAM(s) Oral daily  dextrose 50% Injectable 50 milliLiter(s) IV Push every 15 minutes  dextrose 50% Injectable 25 milliLiter(s) IV Push every 15 minutes  famotidine Injectable 20 milliGRAM(s) IV Push every 12 hours  heparin  Injectable 7500 Unit(s) SubCutaneous every 8 hours  insulin regular Infusion 1 Unit(s)/Hr (1 mL/Hr) IV Continuous <Continuous>  sodium chloride 0.9%. 1000 milliLiter(s) (10 mL/Hr) IV Continuous <Continuous>    MEDICATIONS  (PRN):  fentaNYL    Injectable 25 MICROGram(s) IV Push every 3 hours PRN Severe Pain (7 - 10)       PROBLEM LIST/ ASSESSMENT:  HEALTH ISSUES - PROBLEM Dx:  Need for prophylactic measure: Need for prophylactic measure  Nutrition, metabolism, and development symptoms: Nutrition, metabolism, and development symptoms  ST elevation myocardial infarction (STEMI), unspecified artery: ST elevation myocardial infarction (STEMI), unspecified artery      ,   Patient is a 44y old  Male who presents with a chief complaint of STEMI (2019 13:52)     s/p cardiac surgery          My plan includes :  close hemodynamic, ventilatory and drain monitoring and management per post op routine    Monitor for arrhythmias and monitor parameters for organ perfusion  beta blockade not administered due to hemodynamic instability and bradycardia  monitor neurologic status  Head of the bed should remain elevated to 45 deg .   chest PT and IS will be encouraged  monitor adequacy of oxygenation and ventilation and attempt to wean oxygen  antibiotic regimen will be tailored to the clinical, laboratory and microbiologic data  Nutritional goals will be met using po eventually , ensure adequate caloric intake and montior the same  Stress ulcer and VTE prophylaxis will be achieved    Glycemic control is satisfactory  Electrolytes have been repleted as necessary and wound care has been carried out. Pain control has been achieved.   agressive physical therapy and early mobility and ambulation goals will be met   The family was updated about the course and plan  CRITICAL CARE TIME personally provided by me  in evaluation and management, reassessments, review and interpretation of labs and x-rays, ventilator and hemodynamic management, formulating a plan and coordinating care: ___90____ MIN.  Time does not include procedural time.  CTICU ATTENDING     					    Mika Soliman MD

## 2019-07-30 NOTE — BRIEF OPERATIVE NOTE - NSICDXBRIEFPROCEDURE_GEN_ALL_CORE_FT
PROCEDURES:  OPCAB (off-pump coronary artery bypass) 30-Jul-2019 12:59:55 LIMA-LAD-Diag, Radial- OM EF 50% Mary Adamson

## 2019-07-30 NOTE — CONSULT NOTE ADULT - SUBJECTIVE AND OBJECTIVE BOX
45 yo Male with PAST MEDICAL & SURGICAL HISTORY: (Fhx of father with ACS s/p CABG age ~46yo) who presented to the ED with crushing chest pain and left arm pain after co-worker (a pharmacist) found him to be diaphoretic and pale. Patient reports first feeling the chest tightness on Wednesday prior to presentation. However, on day of admission he was on his way to work and he reports crushing, 8/10 pain which is worse from day prior and associated left arm pain, [place]. Found to have Inferior/Posterior STEMI s/p JESSICA to RCA. Now s/p OP CAB x3, post op with normal EF.    Vital Signs Last 24 Hrs  T(C): 36.1 (30 Jul 2019 13:02), Max: 37.3 (29 Jul 2019 23:46)  T(F): 97 (30 Jul 2019 13:02), Max: 99.2 (29 Jul 2019 23:46)  HR: 96 (30 Jul 2019 13:45) (62 - 100)  BP: 125/86 (30 Jul 2019 06:00) (87/65 - 134/104)  BP(mean): 96 (30 Jul 2019 06:00) (65 - 115)  RR: 19 (30 Jul 2019 13:45) (9 - 29)  SpO2: 100% (30 Jul 2019 13:45) (96% - 100%)    Intubated, sedated  follows commands  clean sternal incision  Mediastinal and Pleural drain  soft abdomen, non distended  no pedal edema    WBC 13.5, Hg 10.7, plt 125  INR 1.4  7.4/38/178 on A/C     Lactate, Blood: 1.7 mmoL/L (07-30-19 @ 13:35)    a/p:    CAD with STEMI s/p OP CAB x3  Acute post thoracotomy pain    wean sedation, wean vent to extubate  hemodynamic support to keep MAP ~ 65  Pain control PRN  Glycemic control  GI and DVT proph  ASA, STAtin and beta blocker once feasible.      50 minutes of critical care time spent providing medical care for patient's acute illness/conditions that impairs at least one vital organ system and/or poses a high risk of imminent or life threatening deterioration in the patient's condition. It includes time spent evaluating and treating the patient's acute illness as well as time spent reviewing labs, radiology, discussing goals of care with patient and/or patient's family, and discussing the case with a multidisciplinary team in an effort to prevent further life threatening deterioration or end organ damage. This time is independent of any procedures performed.

## 2019-07-30 NOTE — CHART NOTE - NSCHARTNOTEFT_GEN_A_CORE
Pt was seen the morning of surgery. Pulsatile index was used to evaluate the suitability of left radial artery for conduit. Radial artery was compressed, and pulsatile index on the thumb remained >70% of initial reading. Pulsatile index on 2nd finger dropped from 0.62 to 0.21. Doppler of the deep and superficial palmar arch showed no change in pulse with compression of the radial artery. No change in O2 saturation on the thumb and on the index finger with compression of radial artery. Findings discussed with Dr. Cooper, radial artery deemed suitable for harvest for CABG conduit.

## 2019-07-31 PROBLEM — Z09 POSTOP CHECK: Status: ACTIVE | Noted: 2019-07-31

## 2019-07-31 PROBLEM — Z95.1 S/P CABG X 3: Status: ACTIVE | Noted: 2019-07-31

## 2019-07-31 PROBLEM — I25.10 CAD (CORONARY ARTERY DISEASE): Status: ACTIVE | Noted: 2019-07-31

## 2019-07-31 LAB
ALBUMIN SERPL ELPH-MCNC: 3.7 G/DL — SIGNIFICANT CHANGE UP (ref 3.3–5)
ALP SERPL-CCNC: 54 U/L — SIGNIFICANT CHANGE UP (ref 40–120)
ALT FLD-CCNC: 26 U/L — SIGNIFICANT CHANGE UP (ref 10–45)
ANION GAP SERPL CALC-SCNC: 10 MMOL/L — SIGNIFICANT CHANGE UP (ref 5–17)
ANION GAP SERPL CALC-SCNC: 11 MMOL/L — SIGNIFICANT CHANGE UP (ref 5–17)
APTT BLD: 35.3 SEC — SIGNIFICANT CHANGE UP (ref 27.5–36.3)
AST SERPL-CCNC: 28 U/L — SIGNIFICANT CHANGE UP (ref 10–40)
BILIRUB SERPL-MCNC: 1.1 MG/DL — SIGNIFICANT CHANGE UP (ref 0.2–1.2)
BUN SERPL-MCNC: 12 MG/DL — SIGNIFICANT CHANGE UP (ref 7–23)
BUN SERPL-MCNC: 12 MG/DL — SIGNIFICANT CHANGE UP (ref 7–23)
CALCIUM SERPL-MCNC: 8.5 MG/DL — SIGNIFICANT CHANGE UP (ref 8.4–10.5)
CALCIUM SERPL-MCNC: 8.8 MG/DL — SIGNIFICANT CHANGE UP (ref 8.4–10.5)
CHLORIDE SERPL-SCNC: 100 MMOL/L — SIGNIFICANT CHANGE UP (ref 96–108)
CHLORIDE SERPL-SCNC: 101 MMOL/L — SIGNIFICANT CHANGE UP (ref 96–108)
CO2 SERPL-SCNC: 25 MMOL/L — SIGNIFICANT CHANGE UP (ref 22–31)
CO2 SERPL-SCNC: 26 MMOL/L — SIGNIFICANT CHANGE UP (ref 22–31)
CREAT SERPL-MCNC: 0.84 MG/DL — SIGNIFICANT CHANGE UP (ref 0.5–1.3)
CREAT SERPL-MCNC: 1.07 MG/DL — SIGNIFICANT CHANGE UP (ref 0.5–1.3)
GAS PNL BLDA: SIGNIFICANT CHANGE UP
GLUCOSE BLDC GLUCOMTR-MCNC: 108 MG/DL — HIGH (ref 70–99)
GLUCOSE BLDC GLUCOMTR-MCNC: 111 MG/DL — HIGH (ref 70–99)
GLUCOSE BLDC GLUCOMTR-MCNC: 122 MG/DL — HIGH (ref 70–99)
GLUCOSE BLDC GLUCOMTR-MCNC: 153 MG/DL — HIGH (ref 70–99)
GLUCOSE SERPL-MCNC: 121 MG/DL — HIGH (ref 70–99)
GLUCOSE SERPL-MCNC: 124 MG/DL — HIGH (ref 70–99)
HCT VFR BLD CALC: 33.1 % — LOW (ref 39–50)
HCT VFR BLD CALC: 33.3 % — LOW (ref 39–50)
HGB BLD-MCNC: 11.2 G/DL — LOW (ref 13–17)
HGB BLD-MCNC: 11.5 G/DL — LOW (ref 13–17)
INR BLD: 1.27 — HIGH (ref 0.88–1.16)
LACTATE SERPL-SCNC: 0.7 MMOL/L — SIGNIFICANT CHANGE UP (ref 0.5–2)
MAGNESIUM SERPL-MCNC: 1.9 MG/DL — SIGNIFICANT CHANGE UP (ref 1.6–2.6)
MAGNESIUM SERPL-MCNC: 2.3 MG/DL — SIGNIFICANT CHANGE UP (ref 1.6–2.6)
MCHC RBC-ENTMCNC: 30.3 PG — SIGNIFICANT CHANGE UP (ref 27–34)
MCHC RBC-ENTMCNC: 30.6 PG — SIGNIFICANT CHANGE UP (ref 27–34)
MCHC RBC-ENTMCNC: 33.6 GM/DL — SIGNIFICANT CHANGE UP (ref 32–36)
MCHC RBC-ENTMCNC: 34.7 GM/DL — SIGNIFICANT CHANGE UP (ref 32–36)
MCV RBC AUTO: 88 FL — SIGNIFICANT CHANGE UP (ref 80–100)
MCV RBC AUTO: 90 FL — SIGNIFICANT CHANGE UP (ref 80–100)
NRBC # BLD: 0 /100 WBCS — SIGNIFICANT CHANGE UP (ref 0–0)
NRBC # BLD: 0 /100 WBCS — SIGNIFICANT CHANGE UP (ref 0–0)
PHOSPHATE SERPL-MCNC: 3.5 MG/DL — SIGNIFICANT CHANGE UP (ref 2.5–4.5)
PLATELET # BLD AUTO: 174 K/UL — SIGNIFICANT CHANGE UP (ref 150–400)
PLATELET # BLD AUTO: 180 K/UL — SIGNIFICANT CHANGE UP (ref 150–400)
POTASSIUM SERPL-MCNC: 4.1 MMOL/L — SIGNIFICANT CHANGE UP (ref 3.5–5.3)
POTASSIUM SERPL-MCNC: 4.3 MMOL/L — SIGNIFICANT CHANGE UP (ref 3.5–5.3)
POTASSIUM SERPL-SCNC: 4.1 MMOL/L — SIGNIFICANT CHANGE UP (ref 3.5–5.3)
POTASSIUM SERPL-SCNC: 4.3 MMOL/L — SIGNIFICANT CHANGE UP (ref 3.5–5.3)
PROT SERPL-MCNC: 6 G/DL — SIGNIFICANT CHANGE UP (ref 6–8.3)
PROTHROM AB SERPL-ACNC: 14.5 SEC — HIGH (ref 10–12.9)
RBC # BLD: 3.7 M/UL — LOW (ref 4.2–5.8)
RBC # BLD: 3.76 M/UL — LOW (ref 4.2–5.8)
RBC # FLD: 11.6 % — SIGNIFICANT CHANGE UP (ref 10.3–14.5)
RBC # FLD: 11.8 % — SIGNIFICANT CHANGE UP (ref 10.3–14.5)
SODIUM SERPL-SCNC: 136 MMOL/L — SIGNIFICANT CHANGE UP (ref 135–145)
SODIUM SERPL-SCNC: 137 MMOL/L — SIGNIFICANT CHANGE UP (ref 135–145)
WBC # BLD: 10.1 K/UL — SIGNIFICANT CHANGE UP (ref 3.8–10.5)
WBC # BLD: 10.91 K/UL — HIGH (ref 3.8–10.5)
WBC # FLD AUTO: 10.1 K/UL — SIGNIFICANT CHANGE UP (ref 3.8–10.5)
WBC # FLD AUTO: 10.91 K/UL — HIGH (ref 3.8–10.5)

## 2019-07-31 PROCEDURE — 71045 X-RAY EXAM CHEST 1 VIEW: CPT | Mod: 26

## 2019-07-31 PROCEDURE — 71045 X-RAY EXAM CHEST 1 VIEW: CPT | Mod: 26,77

## 2019-07-31 RX ORDER — ALBUMIN HUMAN 25 %
250 VIAL (ML) INTRAVENOUS ONCE
Refills: 0 | Status: COMPLETED | OUTPATIENT
Start: 2019-07-31 | End: 2019-07-31

## 2019-07-31 RX ORDER — DEXTROSE 50 % IN WATER 50 %
12.5 SYRINGE (ML) INTRAVENOUS ONCE
Refills: 0 | Status: DISCONTINUED | OUTPATIENT
Start: 2019-07-31 | End: 2019-08-02

## 2019-07-31 RX ORDER — TICAGRELOR 90 MG/1
180 TABLET ORAL ONCE
Refills: 0 | Status: COMPLETED | OUTPATIENT
Start: 2019-07-31 | End: 2019-07-31

## 2019-07-31 RX ORDER — TICAGRELOR 90 MG/1
90 TABLET ORAL EVERY 12 HOURS
Refills: 0 | Status: DISCONTINUED | OUTPATIENT
Start: 2019-08-01 | End: 2019-08-02

## 2019-07-31 RX ORDER — DEXTROSE 50 % IN WATER 50 %
25 SYRINGE (ML) INTRAVENOUS ONCE
Refills: 0 | Status: DISCONTINUED | OUTPATIENT
Start: 2019-07-31 | End: 2019-08-02

## 2019-07-31 RX ORDER — POLYETHYLENE GLYCOL 3350 17 G/17G
17 POWDER, FOR SOLUTION ORAL DAILY
Refills: 0 | Status: DISCONTINUED | OUTPATIENT
Start: 2019-07-31 | End: 2019-08-02

## 2019-07-31 RX ORDER — SODIUM CHLORIDE 9 MG/ML
1000 INJECTION, SOLUTION INTRAVENOUS
Refills: 0 | Status: DISCONTINUED | OUTPATIENT
Start: 2019-07-31 | End: 2019-08-02

## 2019-07-31 RX ORDER — GLUCAGON INJECTION, SOLUTION 0.5 MG/.1ML
1 INJECTION, SOLUTION SUBCUTANEOUS ONCE
Refills: 0 | Status: DISCONTINUED | OUTPATIENT
Start: 2019-07-31 | End: 2019-08-02

## 2019-07-31 RX ORDER — PANTOPRAZOLE SODIUM 20 MG/1
40 TABLET, DELAYED RELEASE ORAL
Refills: 0 | Status: DISCONTINUED | OUTPATIENT
Start: 2019-07-31 | End: 2019-08-02

## 2019-07-31 RX ORDER — INSULIN LISPRO 100/ML
VIAL (ML) SUBCUTANEOUS
Refills: 0 | Status: DISCONTINUED | OUTPATIENT
Start: 2019-07-31 | End: 2019-08-02

## 2019-07-31 RX ORDER — OXYCODONE AND ACETAMINOPHEN 5; 325 MG/1; MG/1
1 TABLET ORAL EVERY 4 HOURS
Refills: 0 | Status: DISCONTINUED | OUTPATIENT
Start: 2019-07-31 | End: 2019-08-02

## 2019-07-31 RX ORDER — METOPROLOL TARTRATE 50 MG
12.5 TABLET ORAL
Refills: 0 | Status: DISCONTINUED | OUTPATIENT
Start: 2019-07-31 | End: 2019-08-01

## 2019-07-31 RX ORDER — SODIUM CHLORIDE 9 MG/ML
3 INJECTION INTRAMUSCULAR; INTRAVENOUS; SUBCUTANEOUS EVERY 8 HOURS
Refills: 0 | Status: DISCONTINUED | OUTPATIENT
Start: 2019-07-31 | End: 2019-08-02

## 2019-07-31 RX ORDER — DOCUSATE SODIUM 100 MG
100 CAPSULE ORAL THREE TIMES A DAY
Refills: 0 | Status: DISCONTINUED | OUTPATIENT
Start: 2019-07-31 | End: 2019-08-02

## 2019-07-31 RX ORDER — SENNA PLUS 8.6 MG/1
2 TABLET ORAL AT BEDTIME
Refills: 0 | Status: DISCONTINUED | OUTPATIENT
Start: 2019-07-31 | End: 2019-08-02

## 2019-07-31 RX ORDER — DEXTROSE 50 % IN WATER 50 %
15 SYRINGE (ML) INTRAVENOUS ONCE
Refills: 0 | Status: DISCONTINUED | OUTPATIENT
Start: 2019-07-31 | End: 2019-08-02

## 2019-07-31 RX ORDER — OXYCODONE AND ACETAMINOPHEN 5; 325 MG/1; MG/1
2 TABLET ORAL EVERY 6 HOURS
Refills: 0 | Status: DISCONTINUED | OUTPATIENT
Start: 2019-07-31 | End: 2019-08-02

## 2019-07-31 RX ORDER — ACETAMINOPHEN 500 MG
650 TABLET ORAL EVERY 6 HOURS
Refills: 0 | Status: DISCONTINUED | OUTPATIENT
Start: 2019-07-31 | End: 2019-08-02

## 2019-07-31 RX ORDER — AMLODIPINE BESYLATE 2.5 MG/1
2.5 TABLET ORAL DAILY
Refills: 0 | Status: DISCONTINUED | OUTPATIENT
Start: 2019-07-31 | End: 2019-08-02

## 2019-07-31 RX ADMIN — Medication 125 MILLILITER(S): at 15:17

## 2019-07-31 RX ADMIN — FENTANYL CITRATE 25 MICROGRAM(S): 50 INJECTION INTRAVENOUS at 04:45

## 2019-07-31 RX ADMIN — HEPARIN SODIUM 7500 UNIT(S): 5000 INJECTION INTRAVENOUS; SUBCUTANEOUS at 21:49

## 2019-07-31 RX ADMIN — CHLORHEXIDINE GLUCONATE 1 APPLICATION(S): 213 SOLUTION TOPICAL at 06:15

## 2019-07-31 RX ADMIN — FENTANYL CITRATE 25 MICROGRAM(S): 50 INJECTION INTRAVENOUS at 04:59

## 2019-07-31 RX ADMIN — Medication 2000 MILLIGRAM(S): at 10:00

## 2019-07-31 RX ADMIN — SENNA PLUS 2 TABLET(S): 8.6 TABLET ORAL at 21:49

## 2019-07-31 RX ADMIN — OXYCODONE AND ACETAMINOPHEN 2 TABLET(S): 5; 325 TABLET ORAL at 16:50

## 2019-07-31 RX ADMIN — FENTANYL CITRATE 25 MICROGRAM(S): 50 INJECTION INTRAVENOUS at 01:20

## 2019-07-31 RX ADMIN — HEPARIN SODIUM 7500 UNIT(S): 5000 INJECTION INTRAVENOUS; SUBCUTANEOUS at 06:18

## 2019-07-31 RX ADMIN — OXYCODONE AND ACETAMINOPHEN 1 TABLET(S): 5; 325 TABLET ORAL at 11:53

## 2019-07-31 RX ADMIN — ATORVASTATIN CALCIUM 80 MILLIGRAM(S): 80 TABLET, FILM COATED ORAL at 21:49

## 2019-07-31 RX ADMIN — POLYETHYLENE GLYCOL 3350 17 GRAM(S): 17 POWDER, FOR SOLUTION ORAL at 11:55

## 2019-07-31 RX ADMIN — Medication 2000 MILLIGRAM(S): at 03:01

## 2019-07-31 RX ADMIN — OXYCODONE AND ACETAMINOPHEN 2 TABLET(S): 5; 325 TABLET ORAL at 07:03

## 2019-07-31 RX ADMIN — Medication 100 MILLIGRAM(S): at 14:23

## 2019-07-31 RX ADMIN — TICAGRELOR 180 MILLIGRAM(S): 90 TABLET ORAL at 19:13

## 2019-07-31 RX ADMIN — OXYCODONE AND ACETAMINOPHEN 2 TABLET(S): 5; 325 TABLET ORAL at 23:33

## 2019-07-31 RX ADMIN — Medication 81 MILLIGRAM(S): at 11:15

## 2019-07-31 RX ADMIN — Medication 2: at 21:49

## 2019-07-31 RX ADMIN — SODIUM CHLORIDE 3 MILLILITER(S): 9 INJECTION INTRAMUSCULAR; INTRAVENOUS; SUBCUTANEOUS at 21:50

## 2019-07-31 RX ADMIN — OXYCODONE AND ACETAMINOPHEN 2 TABLET(S): 5; 325 TABLET ORAL at 06:42

## 2019-07-31 RX ADMIN — Medication 2000 MILLIGRAM(S): at 19:14

## 2019-07-31 RX ADMIN — Medication 100 MILLIGRAM(S): at 21:49

## 2019-07-31 RX ADMIN — Medication 12.5 MILLIGRAM(S): at 09:34

## 2019-07-31 RX ADMIN — AMLODIPINE BESYLATE 2.5 MILLIGRAM(S): 2.5 TABLET ORAL at 08:41

## 2019-07-31 RX ADMIN — FENTANYL CITRATE 25 MICROGRAM(S): 50 INJECTION INTRAVENOUS at 01:05

## 2019-07-31 RX ADMIN — Medication 12.5 MILLIGRAM(S): at 17:31

## 2019-07-31 RX ADMIN — OXYCODONE AND ACETAMINOPHEN 1 TABLET(S): 5; 325 TABLET ORAL at 11:15

## 2019-07-31 RX ADMIN — PANTOPRAZOLE SODIUM 40 MILLIGRAM(S): 20 TABLET, DELAYED RELEASE ORAL at 06:19

## 2019-07-31 RX ADMIN — SODIUM CHLORIDE 3 MILLILITER(S): 9 INJECTION INTRAMUSCULAR; INTRAVENOUS; SUBCUTANEOUS at 14:08

## 2019-07-31 RX ADMIN — OXYCODONE AND ACETAMINOPHEN 2 TABLET(S): 5; 325 TABLET ORAL at 16:14

## 2019-07-31 RX ADMIN — HEPARIN SODIUM 7500 UNIT(S): 5000 INJECTION INTRAVENOUS; SUBCUTANEOUS at 14:23

## 2019-07-31 NOTE — PHYSICAL THERAPY INITIAL EVALUATION ADULT - GENERAL OBSERVATIONS, REHAB EVAL
Patient received seated out of bed no acute distress +telemetry +chest tube x2 +temp PPM, cleared for PT by JAY Talbot, agreeable to PT Eval. Left as found all lines intact +call [facility], JAY Talbot aware. FIM 5

## 2019-07-31 NOTE — MEDICAL STUDENT PROGRESS NOTE(EDUCATION) - NS MD HP STUD SUPERVISOR COMMENTS FT
Above reviewed.  - Continue Aspirin 81mg PO qd, Atorvastatin 80mg PO qhs, Metoprolol 12.5mg PO BID (titrate up as tolerated), and Amlodipine 2.5mg PO qd to prevent vasospasms in the setting of radial artery harvest.   - Will get Brilinta load 180mg today, and continue Brilinta 90mg tomorrow, per Dr. Cooper. No plavix indicated at this time.  - Right pleural chest tube removed at bedside, follow up CXR without pneumothorax.   - GI PPx with pantoprazole, DVT ppx with SQH and SCDs.

## 2019-07-31 NOTE — PHYSICAL THERAPY INITIAL EVALUATION ADULT - STANDING BALANCE: STATIC
Cataract symptoms i.e., glare, blur discussed. Pt to call if worsening vision or trouble with driving, TV, reading, ADL. UV precautions. Reviewed possibility of future cataract surgery.
Discussed importance of compliance with ocular medications and follow up exams to prevent loss of vision.
I will add the following medication Alphagan BID OU.
Monitor.
Recommended cool compresses.  Maxitrol qid OS for one week.
The IOP is above the target range.
fair plus

## 2019-07-31 NOTE — MEDICAL STUDENT PROGRESS NOTE(EDUCATION) - NS MD HP STUD ASPLAN ASSES FT
ASSESSMENT:  43yo M with no PMH originally presented to the ED (7/26/2019) with chest pain and tightness and was found to have an inferior wall STEMI. Pt was ASA/Brilinta loaded, sent to the cath lab, found to have severe 3vCAD (RCA, L main, LAD lesions), and received a JESSICA to the mid-RCA. Dr. Cooper was consulted for open CABG due to severe disease. Echo (7/26/2019) showed EF of 45-50% and mild segmental left ventricular systolic dysfunction with akinesis of basal inferior and hypokinesis of the inferolateral wall. Patient was aspirin and Brilinta loaded, transitioned to Plavix 75mg as per CT surgery, placed on Lipitor 80mg, Lopressor 12.5mg BID and on a heparin ggt 10 ml/hr. Pt now POD#1 from an OpCABG x 3 (LIMA-LAD-Diag, Radial artery-OM) on 7/30/2019 and transferred from the unit to the [facility] this AM. Pt currently afebrile and hemodynamically stable. ASSESSMENT:  43yo M with no PMH originally presented to the ED (7/26/2019) with chest pain and tightness and was found to have an inferior wall STEMI. Pt was ASA/Brilinta loaded, sent to the cath lab, found to have 3vCAD (RCA, L main, LAD lesions), and received a JESSICA to the mid-RCA. Dr. Cooper was consulted for CABG due to severe disease. Echo (7/26/2019) showed EF of 45-50% and mild segmental left ventricular systolic dysfunction with akinesis of basal inferior and hypokinesis of the inferolateral wall. Pt now POD#1 from CABG x 3 (LIMA-LAD-Diag, Radial artery-OM) on 7/30/2019 and transferred from the unit to the [facility] this AM. Pt currently afebrile and hemodynamically stable.

## 2019-07-31 NOTE — MEDICAL STUDENT PROGRESS NOTE(EDUCATION) - NS MD HP STUD ASPLAN PLAN FT
PLAN:  Neurological: Pain well managed. No active issues.  -Pain well controlled with Tylenol PRN for mild pain and Percocet PRN for moderate-severe pain.    Cardiovascular: 3vCAD, inferior wall STEMI s/p JESSICA to mRCA and 3v CABG. Hemodynamically stable/no active issues.  -Serosanguinous drainage from both mediastinal and pleural tubes. Monitor drainage.  -Daily labs - CBC, BMP, PT/PTT/INR, Magnesium and Phosphorus levels.  -Post-CABG meds - Atorvostatin, ASA, and Metoprolol Tartrate (hold BB if SBP <90, HR<60).  -Amlodipine for radial artery graft.  -Monitor telemetry/HR/BP.    Pulmonary: No active issues  -Daily CXR.  -Encouraged patient to use IS 10x/hour and ambulate as tolerate.  -Supplemental O2 PRN.    Gastrointestinal: Has not yet had a BM since sx,  -Colace, Miralax, and Senna to help stimulate bowels. Encouraged ambulation.  -GI ppx - Pantoprazole.  -Diet - Consistent Carbohydrate/No Snacks - low sodium    Renal/: BUN/Cr stable. No active issues  -BUN/Cr - 12/0.82 (7/31/19). Continue to monitor.  -Monitor I&O's.  -Replete lytes as needed.    Hematology: H/H stable. No active issues.  -H/H - 11.5/33.1 (7/31/19). Continue to monitor.  -DVT ppx - SQ Heparin q8h and SCDs.    Endocrine: BMP serum gluc ranging from 119-151. Elevated total cholesterol (264) and LDL (199) from 7/26/19.    -Monitor blood glucose level.   -On statin for CABG procedure.  -No known history of thyroid disorder or diabetes - TSH: 1.19, A1c: 5.6% (7/26/19).    ID: No active issues.  -WBC 10.1 (7/31/19).  -Continue to monitor for fever or elevated white count.  -Post-op ppx abx - Cefazolin.    Disposition:  -Pending pain management and overall clinical picture.    Discuss above plan with BEATRIZ Bryant.    Felicia DENT PLAN:  Neurological: Pain well managed. No active issues.  -Pain well controlled with Tylenol PRN for mild pain and Percocet PRN for moderate-severe pain.    Cardiovascular: 3vCAD, inferior wall STEMI s/p JESSICA to mRCA and 3v CABG. Hemodynamically stable/no active issues.  -Serosanguinous drainage from both mediastinal and pleural tubes. Monitor drainage.  -Daily labs - CBC, BMP, PT/PTT/INR, Magnesium and Phosphorus levels.  -Post-CABG meds - Atorvostatin 80mg nightly, ASA 81mg daily, and Metoprolol Tartrate 12.5mg BID (hold BB if SBP <90, HR<60).  -Amlodipine 2.5mg daily for radial artery graft.  -Monitor telemetry/HR/BP.    Pulmonary: No active issues.  -Daily CXR.  -Encouraged patient to use IS 10x/hour and ambulate as tolerate.  -Supplemental O2 PRN.    Gastrointestinal: Has not yet had a BM since sx.  -Colace 100mg TID, Miralax 17g daily, and Senna 2 tablets nightly to help stimulate bowels. Encouraged ambulation.  -GI ppx - Pantoprazole 40mg daily.  -Diet - Consistent Carbohydrate/No Snacks - low sodium    Renal/: BUN/Cr stable. No active issues.  -BUN/Cr - 12/0.82 (7/31/19). Continue to monitor.  -Monitor I&O's.  -Replete lytes as needed.    Hematology: H/H stable. No active issues.  -H/H - 11.5/33.1 (on 7/31/19). Continue to monitor.  -DVT ppx - Heparin 7500Units SQ q8h and SCDs.    Endocrine: No active issues.  -Monitor blood glucose level.   -No known history of thyroid disorder or diabetes - TSH: 1.19, A1c: 5.6% (7/26/19).    ID: No active issues.  -WBC 10.1 (7/31/19).  -Continue to monitor for fever or elevated white count.  -Post-op ppx abx - Cefazolin.    Disposition:  -Pending pain management and overall clinical picture.    Discussed above plan with BEATRIZ Bryant.    Felicia DENT

## 2019-07-31 NOTE — PHYSICAL THERAPY INITIAL EVALUATION ADULT - PERTINENT HX OF CURRENT PROBLEM, REHAB EVAL
45yo M with no PMHx (Fhx of father with ACS s/p CABG age ~46yo) presented to the ED with crushing chest pain and left arm pain after co-worker (a pharmacist) found him to be diaphoretic and pale. Patient reports first feeling the chest tightness on Wednesday. However, today on the way to work he reports the pain being worse w/ associated left arm pain and [place]aph[place].

## 2019-07-31 NOTE — MEDICAL STUDENT PROGRESS NOTE(EDUCATION) - SUBJECTIVE AND OBJECTIVE BOX
Progress Note:    Provider Specialty: CT Surgery    Reason for Admission: STEMI    Subjective and Objective:   Patient discussed on morning rounds with Dr. Cooper.    Operation/Date:   ·	7/26/2019: JESSICA to mRCA  ·	7/30/2019: OpCABG x 3 (LIMA-LAD-Diag, Radial artery-OM), EF 50%    SUBJECTIVE ASSESSMENT:  44y male now POD1 was seen this morning sitting in chair, awake and alert. Only complaint is mild sternotomy incision pain that is being well management with medication. Pt is tolerating a PO diet, out of bed ambulating, using his IS regularly, urinating spontaneously, and passing flatus, but has not yet had a BM since surgery yesterday. Denies fever/chills, HA, CP, palpitations, arm/jaw pain, dyspnea, cough, abdominal pain, N/V/D, calf pain, or lower extremity edema.    Vital Signs Last 24 Hrs:  T(C): 36.3 (31 Jul 2019 09:00), Max: 37.1 (30 Jul 2019 17:45)  T(F): 97.3 (31 Jul 2019 09:00), Max: 98.8 (30 Jul 2019 17:45)  HR: 84 (31 Jul 2019 11:20) (80 - 102)  BP: 120/80 (31 Jul 2019 11:20) (117/68 - 120/80)  BP(mean): 96 (31 Jul 2019 11:20) (89 - 96)  RR: 18 (31 Jul 2019 11:20) (11 - 19)  SpO2: 93% (31 Jul 2019 11:20) (93% - 100%)      I&O's Detail:    30 Jul 2019 07:01  -  31 Jul 2019 07:00  --------------------------------------------------------  IN:    IV PiggyBack: 250 mL    Oral Fluid: 75 mL    propofol Infusion: 20 mL    sodium chloride 0.9%: 190 mL  Total IN: 535 mL    OUT:    Chest Tube: 330 mL    Chest Tube: 260 mL    Indwelling Catheter - Urethral: 2225 mL  Total OUT: 2815 mL    Total NET: -2280 mL      31 Jul 2019 07:01  -  31 Jul 2019 12:03  --------------------------------------------------------  IN:    sodium chloride 0.9%: 10 mL  Total IN: 10 mL    OUT:    Chest Tube: 10 mL    Indwelling Catheter - Urethral: 45 mL    Voided: 250 mL  Total OUT: 305 mL    Total NET: -295 mL    Mediastinal and pleural drains-  CHEST TUBE: Yes.  ISABELLE DRAIN:   EPICARDIAL WIRES: Yes.  TIE DOWNS:  DÍAZ: No.      PHYSICAL EXAM:  General: Pt Pt in NAD, sitting comfortably in bed, pleasant and cooperative throughout encounter.  Neurological: A&O x 3.   Cardiovascular: Regular rate and normal sinus rhythm. Clear S1 and S2. No murmurs, rubs, or gallops.  Respiratory: Breathing without use of accessory muscfles. Lungs clear to auscultation B/L. No wheezing, rhonchi, or rales.  Gastrointestinal: Abdomen non-distended and non-tender.  Extremities: Warm, dry, and well perfused. NO erythema, ulcers, or sores. No pitting edema or calf tenderness.  Vascular: 2+ dorsalis pedis and posterior tibialis pulses B/L.  Incision: All incisions non-erythematous, non-pruritic, non-edematous, mildly tender to palpation. L arm with 2 small incision sites, located at proximal and distal volar forearm. Sternotomy incision healing well. Mediastinal and pleural drains covered with 4x4 dressing and secured in place with tape. Dressings non-bloody and non-pruritic. Drainage serosanguinous in color.      LABS:                        11.5   10.10 )-----------( 174      ( 31 Jul 2019 02:51 )             33.1   07-31    137  |  101  |  12  ----------------------------<  121<H>  4.1   |  25  |  0.84    Ca    8.5      31 Jul 2019 02:51  Phos  3.5     07-31  Mg     2.3     07-31    TPro  6.0  /  Alb  3.7  /  TBili  1.1  /  DBili  x   /  AST  28  /  ALT  26  /  AlkPhos  54  07-31    PT/INR - ( 31 Jul 2019 02:51 )   PT: 14.5 sec;   INR: 1.27        PTT - ( 31 Jul 2019 02:51 )  PTT:35.3 sec    COUMADIN: No.      MEDICATIONS  (STANDING):  amLODIPine   Tablet 2.5 milliGRAM(s) Oral daily  aspirin enteric coated 81 milliGRAM(s) Oral daily  atorvastatin 80 milliGRAM(s) Oral at bedtime  ceFAZolin  Injectable. 2000 milliGRAM(s) IV Push every 8 hours  chlorhexidine 2% Cloths 1 Application(s) Topical <User Schedule>  dextrose 5%. 1000 milliLiter(s) (50 mL/Hr) IV Continuous <Continuous>  dextrose 50% Injectable 12.5 Gram(s) IV Push once  dextrose 50% Injectable 25 Gram(s) IV Push once  dextrose 50% Injectable 25 Gram(s) IV Push once  docusate sodium 100 milliGRAM(s) Oral three times a day  heparin  Injectable 7500 Unit(s) SubCutaneous every 8 hours  insulin lispro (HumaLOG) corrective regimen sliding scale   SubCutaneous Before meals and at bedtime  metoprolol tartrate 12.5 milliGRAM(s) Oral two times a day  pantoprazole    Tablet 40 milliGRAM(s) Oral before breakfast  polyethylene glycol 3350 17 Gram(s) Oral daily  senna 2 Tablet(s) Oral at bedtime  sodium chloride 0.9% lock flush 3 milliLiter(s) IV Push every 8 hours    MEDICATIONS  (PRN):  acetaminophen   Tablet .. 650 milliGRAM(s) Oral every 6 hours PRN Mild Pain (1 - 3)  dextrose 40% Gel 15 Gram(s) Oral once PRN Blood Glucose LESS THAN 70 milliGRAM(s)/deciliter  glucagon  Injectable 1 milliGRAM(s) IntraMuscular once PRN Glucose LESS THAN 70 milligrams/deciliter  oxyCODONE    5 mG/acetaminophen 325 mG 1 Tablet(s) Oral every 4 hours PRN Moderate Pain (4 - 6)  oxyCODONE    5 mG/acetaminophen 325 mG 2 Tablet(s) Oral every 6 hours PRN Severe Pain (7 - 10)      RADIOLOGY & ADDITIONAL TESTS:    XR CHEST PORTABLE ROUTINE 1V                 PROCEDURE DATE:  07/31/2019    INTERPRETATION:  Chest, single portable view  HISTORY: Status post cardiac surgery, assess cardiopulmonary status and support devices.  Comparison: 7/30  IMPRESSION:  Support Devices: ET tube removed, NG tube removed, otherwise stable  Heart: Cardiomegaly stable  Mediastinum:  Unremarkable  Lungs/Airways:  Unremarkable  Bones/Soft tissues: Unremarkable      US DPLX CAROTIDS COMPL BI                          PROCEDURE DATE:  07/27/2019    INTERPRETATION:  CAROTID ARTERIAL DUPLEX DOPPLER ULTRASOUND Performed 7/27/2019 1:00 PM  HISTORY: Preoperative evaluation for triple bypass surgery.  FINDINGS: Images of the carotid arteries demonstrate mild intimal   thickening of the distal common carotid arteries bilaterally and mild   plaques in the bilateral proximal internal carotid arteries.  IMPRESSION:  No hemodynamically significant stenosis.  Mild atherosclerotic changes as above.      EXAM:  ECHOCARDIOGRAM (CARDIOL)                          PROCEDURE DATE:  07/26/2019   LEFT VENTRICLE  There is borderline concentric left ventricular hypertrophy.  Mild segmental left ventricular systolic dysfunction with akinesis of   basal inferior and hypokinesis of the inferolateral wall.  The left ventricular ejection fraction is mildly reduced.  The left ventricular ejection fraction is estimated to be 45-50%  LEFT ATRIUM  The left atrial size is normal.  The mitral inflow pattern is consistent with impaired left ventricular  relaxation with mildly elevated left atrial pressure (8-14mmHg).  RIGHT ATRIUM  Right atrial size is normal.  RIGHT VENTRICLE  The right ventricle is normal in size and function.  AORTIC VALVE  Structurally normal aortic valve.  No aortic regurgitation noted.  MITRAL VALVE  Structurally normal mitral valve.  There is trace mitral regurgitation.  TRICUSPID VALVE  Structurally normal tricuspid valve.  No tricuspid regurgitation noted.  There was insufficient TR detected from which to calculate pulmonary   artery  systolic pressure.  PULMONIC VALVE  Structurally normal pulmonic valve.  No pulmonic regurgitation noted.  ARTERIES AND VENOUS SYSTEM  No aortic root dilatation.  The inferior vena cava is normal in size (<2.1 cm) with normal inspiratory  collapse (>50%) consistent with normal right atrial pressure.  Pericardium / Pleura  There is no pericardial effusion. Progress Note:    Provider Specialty: CT Surgery    Reason for Admission: STEMI    Patient discussed on morning rounds with Dr. Cooper.    Operation/Date:   ·	7/26/2019: JESSICA to mRCA  ·	7/30/2019: OpCABG x 3 (LIMA-LAD-Diag, Radial artery-OM), EF 50%    SUBJECTIVE ASSESSMENT:  44y male now POD1 was seen this morning sitting in chair, awake and alert. Only complaint is mild sternotomy incision pain that is being well managed with medication. Pt is tolerating a PO diet, out of bed ambulating, using his IS regularly, urinating spontaneously, and passing flatus, but has not yet had a BM since surgery yesterday. Denies fever/chills, HA, CP, palpitations, arm/jaw pain, dyspnea, cough, abdominal pain, N/V/D, calf pain, or lower extremity edema.    Vital Signs Last 24 Hrs:  T(C): 36.3 (31 Jul 2019 09:00), Max: 37.1 (30 Jul 2019 17:45)  T(F): 97.3 (31 Jul 2019 09:00), Max: 98.8 (30 Jul 2019 17:45)  HR: 84 (31 Jul 2019 11:20) (80 - 102)  BP: 120/80 (31 Jul 2019 11:20) (117/68 - 120/80)  BP(mean): 96 (31 Jul 2019 11:20) (89 - 96)  RR: 18 (31 Jul 2019 11:20) (11 - 19)  SpO2: 93% (31 Jul 2019 11:20) (93% - 100%)      I&O's Detail:    30 Jul 2019 07:01  -  31 Jul 2019 07:00  --------------------------------------------------------  IN:    IV PiggyBack: 250 mL    Oral Fluid: 75 mL    propofol Infusion: 20 mL    sodium chloride 0.9%: 190 mL  Total IN: 535 mL    OUT:    Chest Tube: 330 mL    Chest Tube: 260 mL    Indwelling Catheter - Urethral: 2225 mL  Total OUT: 2815 mL    Total NET: -2280 mL      31 Jul 2019 07:01  -  31 Jul 2019 12:03  --------------------------------------------------------  IN:    sodium chloride 0.9%: 10 mL  Total IN: 10 mL    OUT:    Chest Tube: 10 mL    Indwelling Catheter - Urethral: 45 mL    Voided: 250 mL  Total OUT: 305 mL    Total NET: -295 mL    Mediastinal and pleural drains-  CHEST TUBE: Yes.  ISABELLE DRAIN:   EPICARDIAL WIRES: Yes.  TIE DOWNS:  DÍAZ: No.      PHYSICAL EXAM:  General: Pt Pt in NAD, sitting comfortably in bed, pleasant and cooperative throughout encounter.  Neurological: A&O x 3.   Cardiovascular: Regular rate and normal sinus rhythm. Clear S1 and S2. No murmurs, rubs, or gallops.  Respiratory: Breathing without use of accessory muscfles. Lungs clear to auscultation B/L. No wheezing, rhonchi, or rales.  Gastrointestinal: Abdomen non-distended and non-tender.  Extremities: Warm, dry, and well perfused. NO erythema, ulcers, or sores. No pitting edema or calf tenderness.  Vascular: 2+ dorsalis pedis and posterior tibialis pulses B/L.  Incision: All incisions non-erythematous, non-pruritic, non-edematous, mildly tender to palpation. L arm with 2 small incision sites, located at proximal and distal volar forearm. Sternotomy incision healing well. Mediastinal and pleural drains covered with 4x4 dressing and secured in place with tape. Dressings non-bloody and non-pruritic. Drainage serosanguinous in color.      LABS:                        11.5   10.10 )-----------( 174      ( 31 Jul 2019 02:51 )             33.1   07-31    137  |  101  |  12  ----------------------------<  121<H>  4.1   |  25  |  0.84    Ca    8.5      31 Jul 2019 02:51  Phos  3.5     07-31  Mg     2.3     07-31    TPro  6.0  /  Alb  3.7  /  TBili  1.1  /  DBili  x   /  AST  28  /  ALT  26  /  AlkPhos  54  07-31    PT/INR - ( 31 Jul 2019 02:51 )   PT: 14.5 sec;   INR: 1.27        PTT - ( 31 Jul 2019 02:51 )  PTT:35.3 sec    COUMADIN: No.      MEDICATIONS  (STANDING):  amLODIPine   Tablet 2.5 milliGRAM(s) Oral daily  aspirin enteric coated 81 milliGRAM(s) Oral daily  atorvastatin 80 milliGRAM(s) Oral at bedtime  ceFAZolin  Injectable. 2000 milliGRAM(s) IV Push every 8 hours  chlorhexidine 2% Cloths 1 Application(s) Topical <User Schedule>  dextrose 5%. 1000 milliLiter(s) (50 mL/Hr) IV Continuous <Continuous>  dextrose 50% Injectable 12.5 Gram(s) IV Push once  dextrose 50% Injectable 25 Gram(s) IV Push once  dextrose 50% Injectable 25 Gram(s) IV Push once  docusate sodium 100 milliGRAM(s) Oral three times a day  heparin  Injectable 7500 Unit(s) SubCutaneous every 8 hours  insulin lispro (HumaLOG) corrective regimen sliding scale   SubCutaneous Before meals and at bedtime  metoprolol tartrate 12.5 milliGRAM(s) Oral two times a day  pantoprazole    Tablet 40 milliGRAM(s) Oral before breakfast  polyethylene glycol 3350 17 Gram(s) Oral daily  senna 2 Tablet(s) Oral at bedtime  sodium chloride 0.9% lock flush 3 milliLiter(s) IV Push every 8 hours    MEDICATIONS  (PRN):  acetaminophen   Tablet .. 650 milliGRAM(s) Oral every 6 hours PRN Mild Pain (1 - 3)  dextrose 40% Gel 15 Gram(s) Oral once PRN Blood Glucose LESS THAN 70 milliGRAM(s)/deciliter  glucagon  Injectable 1 milliGRAM(s) IntraMuscular once PRN Glucose LESS THAN 70 milligrams/deciliter  oxyCODONE    5 mG/acetaminophen 325 mG 1 Tablet(s) Oral every 4 hours PRN Moderate Pain (4 - 6)  oxyCODONE    5 mG/acetaminophen 325 mG 2 Tablet(s) Oral every 6 hours PRN Severe Pain (7 - 10)      RADIOLOGY & ADDITIONAL TESTS:    XR CHEST PORTABLE ROUTINE 1V                 PROCEDURE DATE:  07/31/2019    INTERPRETATION:  Chest, single portable view  HISTORY: Status post cardiac surgery, assess cardiopulmonary status and support devices.  Comparison: 7/30  IMPRESSION:  Support Devices: ET tube removed, NG tube removed, otherwise stable  Heart: Cardiomegaly stable  Mediastinum:  Unremarkable  Lungs/Airways:  Unremarkable  Bones/Soft tissues: Unremarkable      US DPLX CAROTIDS COMPL BI                          PROCEDURE DATE:  07/27/2019    INTERPRETATION:  CAROTID ARTERIAL DUPLEX DOPPLER ULTRASOUND Performed 7/27/2019 1:00 PM  HISTORY: Preoperative evaluation for triple bypass surgery.  FINDINGS: Images of the carotid arteries demonstrate mild intimal   thickening of the distal common carotid arteries bilaterally and mild   plaques in the bilateral proximal internal carotid arteries.  IMPRESSION:  No hemodynamically significant stenosis.  Mild atherosclerotic changes as above.      EXAM:  ECHOCARDIOGRAM (CARDIOL)                          PROCEDURE DATE:  07/26/2019   LEFT VENTRICLE  There is borderline concentric left ventricular hypertrophy.  Mild segmental left ventricular systolic dysfunction with akinesis of   basal inferior and hypokinesis of the inferolateral wall.  The left ventricular ejection fraction is mildly reduced.  The left ventricular ejection fraction is estimated to be 45-50%  LEFT ATRIUM  The left atrial size is normal.  The mitral inflow pattern is consistent with impaired left ventricular  relaxation with mildly elevated left atrial pressure (8-14mmHg).  RIGHT ATRIUM  Right atrial size is normal.  RIGHT VENTRICLE  The right ventricle is normal in size and function.  AORTIC VALVE  Structurally normal aortic valve.  No aortic regurgitation noted.  MITRAL VALVE  Structurally normal mitral valve.  There is trace mitral regurgitation.  TRICUSPID VALVE  Structurally normal tricuspid valve.  No tricuspid regurgitation noted.  There was insufficient TR detected from which to calculate pulmonary   artery  systolic pressure.  PULMONIC VALVE  Structurally normal pulmonic valve.  No pulmonic regurgitation noted.  ARTERIES AND VENOUS SYSTEM  No aortic root dilatation.  The inferior vena cava is normal in size (<2.1 cm) with normal inspiratory  collapse (>50%) consistent with normal right atrial pressure.  Pericardium / Pleura  There is no pericardial effusion. Progress Note:    Provider Specialty: CT Surgery    Reason for Admission: STEMI    Patient discussed on morning rounds with Dr. Cooper.    Operation/Date:   ·	7/26/2019: JESSICA to mRCA  ·	7/30/2019: OpCABG x 3 (LIMA-LAD-Diag, Radial artery-OM), EF 50%    SUBJECTIVE ASSESSMENT:  44y male now POD1 was seen this morning sitting in chair, awake and alert. Only complaint is mild sternotomy incision pain that is being well managed with medication. Pt is tolerating a PO diet, out of bed ambulating, using his IS regularly, urinating spontaneously, and passing flatus, but has not yet had a BM since surgery yesterday. Denies fever/chills, HA, CP, palpitations, arm/jaw pain, dyspnea, cough, abdominal pain, N/V/D, calf pain, or lower extremity edema.    Vital Signs Last 24 Hrs:  T(C): 36.3 (31 Jul 2019 09:00), Max: 37.1 (30 Jul 2019 17:45)  T(F): 97.3 (31 Jul 2019 09:00), Max: 98.8 (30 Jul 2019 17:45)  HR: 84 (31 Jul 2019 11:20) (80 - 102)  BP: 120/80 (31 Jul 2019 11:20) (117/68 - 120/80)  BP(mean): 96 (31 Jul 2019 11:20) (89 - 96)  RR: 18 (31 Jul 2019 11:20) (11 - 19)  SpO2: 93% (31 Jul 2019 11:20) (93% - 100%)      I&O's Detail:    30 Jul 2019 07:01  -  31 Jul 2019 07:00  --------------------------------------------------------  IN:    IV PiggyBack: 250 mL    Oral Fluid: 75 mL    propofol Infusion: 20 mL    sodium chloride 0.9%: 190 mL  Total IN: 535 mL    OUT:    Chest Tube: 330 mL    Chest Tube: 260 mL    Indwelling Catheter - Urethral: 2225 mL  Total OUT: 2815 mL    Total NET: -2280 mL      31 Jul 2019 07:01  -  31 Jul 2019 12:03  --------------------------------------------------------  IN:    sodium chloride 0.9%: 10 mL  Total IN: 10 mL    OUT:    Chest Tube: 10 mL    Indwelling Catheter - Urethral: 45 mL    Voided: 250 mL  Total OUT: 305 mL    Total NET: -295 mL    CHEST TUBE: Yes x 2 (serosanguinous).  ISABELLE DRAIN: Yes x 1 (serosanguinous).  EPICARDIAL WIRES: Yes.  DÍAZ: No.      PHYSICAL EXAM:  General: Pt Pt in NAD, sitting comfortably in bed, pleasant and cooperative throughout encounter.  Neurological: A&O x 3.   Cardiovascular: Regular rate and normal sinus rhythm. Clear S1 and S2. No murmurs, rubs, or gallops.  Respiratory: Breathing without use of accessory muscles. Lungs clear to auscultation B/L, slightly diminished at the bases. No wheezing, rhonchi, or rales.  Gastrointestinal: Abdomen non-distended and non-tender.  Extremities: Warm, dry, and well perfused. No erythema, ulcers, or sores. No pitting edema or calf tenderness.  Vascular: 2+ dorsalis pedis and posterior tibialis pulses B/L.  Incision: All incisions non-erythematous, non-pruritic, non-edematous, mildly tender to palpation. L arm with 2 small incision sites, located at proximal and distal volar forearm. Sternotomy incision covered by mepilex dressing. Mediastinal and pleural drains covered with 4x4 dressing and secured in place with tape. Dressings non-bloody and non-pruritic.       LABS:                        11.5   10.10 )-----------( 174      ( 31 Jul 2019 02:51 )             33.1   07-31    137  |  101  |  12  ----------------------------<  121<H>  4.1   |  25  |  0.84    Ca    8.5      31 Jul 2019 02:51  Phos  3.5     07-31  Mg     2.3     07-31    TPro  6.0  /  Alb  3.7  /  TBili  1.1  /  DBili  x   /  AST  28  /  ALT  26  /  AlkPhos  54  07-31    PT/INR - ( 31 Jul 2019 02:51 )   PT: 14.5 sec;   INR: 1.27        PTT - ( 31 Jul 2019 02:51 )  PTT:35.3 sec    COUMADIN: No.      MEDICATIONS  (STANDING):  amLODIPine   Tablet 2.5 milliGRAM(s) Oral daily  aspirin enteric coated 81 milliGRAM(s) Oral daily  atorvastatin 80 milliGRAM(s) Oral at bedtime  ceFAZolin  Injectable. 2000 milliGRAM(s) IV Push every 8 hours  chlorhexidine 2% Cloths 1 Application(s) Topical <User Schedule>  dextrose 5%. 1000 milliLiter(s) (50 mL/Hr) IV Continuous <Continuous>  dextrose 50% Injectable 12.5 Gram(s) IV Push once  dextrose 50% Injectable 25 Gram(s) IV Push once  dextrose 50% Injectable 25 Gram(s) IV Push once  docusate sodium 100 milliGRAM(s) Oral three times a day  heparin  Injectable 7500 Unit(s) SubCutaneous every 8 hours  insulin lispro (HumaLOG) corrective regimen sliding scale   SubCutaneous Before meals and at bedtime  metoprolol tartrate 12.5 milliGRAM(s) Oral two times a day  pantoprazole    Tablet 40 milliGRAM(s) Oral before breakfast  polyethylene glycol 3350 17 Gram(s) Oral daily  senna 2 Tablet(s) Oral at bedtime  sodium chloride 0.9% lock flush 3 milliLiter(s) IV Push every 8 hours    MEDICATIONS  (PRN):  acetaminophen   Tablet .. 650 milliGRAM(s) Oral every 6 hours PRN Mild Pain (1 - 3)  dextrose 40% Gel 15 Gram(s) Oral once PRN Blood Glucose LESS THAN 70 milliGRAM(s)/deciliter  glucagon  Injectable 1 milliGRAM(s) IntraMuscular once PRN Glucose LESS THAN 70 milligrams/deciliter  oxyCODONE    5 mG/acetaminophen 325 mG 1 Tablet(s) Oral every 4 hours PRN Moderate Pain (4 - 6)  oxyCODONE    5 mG/acetaminophen 325 mG 2 Tablet(s) Oral every 6 hours PRN Severe Pain (7 - 10)      RADIOLOGY & ADDITIONAL TESTS:    XR CHEST PORTABLE ROUTINE 1V                 PROCEDURE DATE:  07/31/2019    INTERPRETATION:  Chest, single portable view  HISTORY: Status post cardiac surgery, assess cardiopulmonary status and support devices.  Comparison: 7/30  IMPRESSION:  Support Devices: ET tube removed, NG tube removed, otherwise stable  Heart: Cardiomegaly stable  Mediastinum:  Unremarkable  Lungs/Airways:  Unremarkable  Bones/Soft tissues: Unremarkable      US DPLX CAROTIDS COMPL BI                          PROCEDURE DATE:  07/27/2019    INTERPRETATION:  CAROTID ARTERIAL DUPLEX DOPPLER ULTRASOUND Performed 7/27/2019 1:00 PM  HISTORY: Preoperative evaluation for triple bypass surgery.  FINDINGS: Images of the carotid arteries demonstrate mild intimal   thickening of the distal common carotid arteries bilaterally and mild   plaques in the bilateral proximal internal carotid arteries.  IMPRESSION:  No hemodynamically significant stenosis.  Mild atherosclerotic changes as above.      EXAM:  ECHOCARDIOGRAM (CARDIOL)                          PROCEDURE DATE:  07/26/2019   LEFT VENTRICLE  There is borderline concentric left ventricular hypertrophy.  Mild segmental left ventricular systolic dysfunction with akinesis of   basal inferior and hypokinesis of the inferolateral wall.  The left ventricular ejection fraction is mildly reduced.  The left ventricular ejection fraction is estimated to be 45-50%  LEFT ATRIUM  The left atrial size is normal.  The mitral inflow pattern is consistent with impaired left ventricular  relaxation with mildly elevated left atrial pressure (8-14mmHg).  RIGHT ATRIUM  Right atrial size is normal.  RIGHT VENTRICLE  The right ventricle is normal in size and function.  AORTIC VALVE  Structurally normal aortic valve.  No aortic regurgitation noted.  MITRAL VALVE  Structurally normal mitral valve.  There is trace mitral regurgitation.  TRICUSPID VALVE  Structurally normal tricuspid valve.  No tricuspid regurgitation noted.  There was insufficient TR detected from which to calculate pulmonary   artery  systolic pressure.  PULMONIC VALVE  Structurally normal pulmonic valve.  No pulmonic regurgitation noted.  ARTERIES AND VENOUS SYSTEM  No aortic root dilatation.  The inferior vena cava is normal in size (<2.1 cm) with normal inspiratory  collapse (>50%) consistent with normal right atrial pressure.  Pericardium / Pleura  There is no pericardial effusion. Progress Note:    Provider Specialty: CT Surgery    Reason for Admission: STEMI    Patient discussed on morning rounds with Dr. Cooper.    Operation/Date:   ·	7/26/2019: JESSICA to mRCA  ·	7/30/2019: CABG x 3 (LIMA-LAD-Diag, Radial artery-OM), EF 50%    SUBJECTIVE ASSESSMENT:  44y male now POD1 was seen this morning sitting in chair, awake and alert. Only complaint is mild sternotomy incision pain that is being well managed with medication. Pt is tolerating a PO diet, out of bed ambulating, using his IS regularly, urinating spontaneously, and passing flatus, but has not yet had a BM since surgery yesterday. Denies fever/chills, HA, CP, palpitations, arm/jaw pain, dyspnea, cough, abdominal pain, N/V/D, calf pain, or lower extremity edema.    Vital Signs Last 24 Hrs:  T(C): 36.3 (31 Jul 2019 09:00), Max: 37.1 (30 Jul 2019 17:45)  T(F): 97.3 (31 Jul 2019 09:00), Max: 98.8 (30 Jul 2019 17:45)  HR: 84 (31 Jul 2019 11:20) (80 - 102)  BP: 120/80 (31 Jul 2019 11:20) (117/68 - 120/80)  BP(mean): 96 (31 Jul 2019 11:20) (89 - 96)  RR: 18 (31 Jul 2019 11:20) (11 - 19)  SpO2: 93% (31 Jul 2019 11:20) (93% - 100%)      I&O's Detail:    30 Jul 2019 07:01  -  31 Jul 2019 07:00  --------------------------------------------------------  IN:    IV PiggyBack: 250 mL    Oral Fluid: 75 mL    propofol Infusion: 20 mL    sodium chloride 0.9%: 190 mL  Total IN: 535 mL    OUT:    Chest Tube: 330 mL    Chest Tube: 260 mL    Indwelling Catheter - Urethral: 2225 mL  Total OUT: 2815 mL    Total NET: -2280 mL      31 Jul 2019 07:01  -  31 Jul 2019 12:03  --------------------------------------------------------  IN:    sodium chloride 0.9%: 10 mL  Total IN: 10 mL    OUT:    Chest Tube: 10 mL    Indwelling Catheter - Urethral: 45 mL    Voided: 250 mL  Total OUT: 305 mL    Total NET: -295 mL    CHEST TUBE: Yes x 2 (serosanguinous).  ISABELLE DRAIN: Yes x 1 (serosanguinous).  EPICARDIAL WIRES: Yes.  DÍAZ: No.      PHYSICAL EXAM:  General: Pt Pt in NAD, sitting comfortably in bed, pleasant and cooperative throughout encounter.  Neurological: A&O x 3.   Cardiovascular: Regular rate and normal sinus rhythm. Clear S1 and S2. No murmurs, rubs, or gallops.  Respiratory: Breathing without use of accessory muscles. Lungs clear to auscultation B/L, slightly diminished at the bases. No wheezing, rhonchi, or rales.  Gastrointestinal: Abdomen non-distended and non-tender.  Extremities: Warm, dry, and well perfused. No erythema, ulcers, or sores. No pitting edema or calf tenderness.  Vascular: 2+ dorsalis pedis and posterior tibialis pulses B/L.  Incision: All incisions non-erythematous, non-pruritic, non-edematous, mildly tender to palpation. L arm with 2 small incision sites, located at proximal and distal volar forearm. Sternotomy incision covered by mepilex dressing. Mediastinal and pleural drains covered with 4x4 dressing and secured in place with tape. Dressings non-bloody and non-pruritic.       LABS:                        11.5   10.10 )-----------( 174      ( 31 Jul 2019 02:51 )             33.1   07-31    137  |  101  |  12  ----------------------------<  121<H>  4.1   |  25  |  0.84    Ca    8.5      31 Jul 2019 02:51  Phos  3.5     07-31  Mg     2.3     07-31    TPro  6.0  /  Alb  3.7  /  TBili  1.1  /  DBili  x   /  AST  28  /  ALT  26  /  AlkPhos  54  07-31    PT/INR - ( 31 Jul 2019 02:51 )   PT: 14.5 sec;   INR: 1.27        PTT - ( 31 Jul 2019 02:51 )  PTT:35.3 sec    COUMADIN: No.      MEDICATIONS  (STANDING):  amLODIPine   Tablet 2.5 milliGRAM(s) Oral daily  aspirin enteric coated 81 milliGRAM(s) Oral daily  atorvastatin 80 milliGRAM(s) Oral at bedtime  ceFAZolin  Injectable. 2000 milliGRAM(s) IV Push every 8 hours  chlorhexidine 2% Cloths 1 Application(s) Topical <User Schedule>  dextrose 5%. 1000 milliLiter(s) (50 mL/Hr) IV Continuous <Continuous>  dextrose 50% Injectable 12.5 Gram(s) IV Push once  dextrose 50% Injectable 25 Gram(s) IV Push once  dextrose 50% Injectable 25 Gram(s) IV Push once  docusate sodium 100 milliGRAM(s) Oral three times a day  heparin  Injectable 7500 Unit(s) SubCutaneous every 8 hours  insulin lispro (HumaLOG) corrective regimen sliding scale   SubCutaneous Before meals and at bedtime  metoprolol tartrate 12.5 milliGRAM(s) Oral two times a day  pantoprazole    Tablet 40 milliGRAM(s) Oral before breakfast  polyethylene glycol 3350 17 Gram(s) Oral daily  senna 2 Tablet(s) Oral at bedtime  sodium chloride 0.9% lock flush 3 milliLiter(s) IV Push every 8 hours    MEDICATIONS  (PRN):  acetaminophen   Tablet .. 650 milliGRAM(s) Oral every 6 hours PRN Mild Pain (1 - 3)  dextrose 40% Gel 15 Gram(s) Oral once PRN Blood Glucose LESS THAN 70 milliGRAM(s)/deciliter  glucagon  Injectable 1 milliGRAM(s) IntraMuscular once PRN Glucose LESS THAN 70 milligrams/deciliter  oxyCODONE    5 mG/acetaminophen 325 mG 1 Tablet(s) Oral every 4 hours PRN Moderate Pain (4 - 6)  oxyCODONE    5 mG/acetaminophen 325 mG 2 Tablet(s) Oral every 6 hours PRN Severe Pain (7 - 10)      RADIOLOGY & ADDITIONAL TESTS:    XR CHEST PORTABLE ROUTINE 1V                 PROCEDURE DATE:  07/31/2019    INTERPRETATION:  Chest, single portable view  HISTORY: Status post cardiac surgery, assess cardiopulmonary status and support devices.  Comparison: 7/30  IMPRESSION:  Support Devices: ET tube removed, NG tube removed, otherwise stable  Heart: Cardiomegaly stable  Mediastinum:  Unremarkable  Lungs/Airways:  Unremarkable  Bones/Soft tissues: Unremarkable      US DPLX CAROTIDS COMPL BI                          PROCEDURE DATE:  07/27/2019    INTERPRETATION:  CAROTID ARTERIAL DUPLEX DOPPLER ULTRASOUND Performed 7/27/2019 1:00 PM  HISTORY: Preoperative evaluation for triple bypass surgery.  FINDINGS: Images of the carotid arteries demonstrate mild intimal   thickening of the distal common carotid arteries bilaterally and mild   plaques in the bilateral proximal internal carotid arteries.  IMPRESSION:  No hemodynamically significant stenosis.  Mild atherosclerotic changes as above.      EXAM:  ECHOCARDIOGRAM (CARDIOL)                          PROCEDURE DATE:  07/26/2019   LEFT VENTRICLE  There is borderline concentric left ventricular hypertrophy.  Mild segmental left ventricular systolic dysfunction with akinesis of   basal inferior and hypokinesis of the inferolateral wall.  The left ventricular ejection fraction is mildly reduced.  The left ventricular ejection fraction is estimated to be 45-50%  LEFT ATRIUM  The left atrial size is normal.  The mitral inflow pattern is consistent with impaired left ventricular  relaxation with mildly elevated left atrial pressure (8-14mmHg).  RIGHT ATRIUM  Right atrial size is normal.  RIGHT VENTRICLE  The right ventricle is normal in size and function.  AORTIC VALVE  Structurally normal aortic valve.  No aortic regurgitation noted.  MITRAL VALVE  Structurally normal mitral valve.  There is trace mitral regurgitation.  TRICUSPID VALVE  Structurally normal tricuspid valve.  No tricuspid regurgitation noted.  There was insufficient TR detected from which to calculate pulmonary   artery  systolic pressure.  PULMONIC VALVE  Structurally normal pulmonic valve.  No pulmonic regurgitation noted.  ARTERIES AND VENOUS SYSTEM  No aortic root dilatation.  The inferior vena cava is normal in size (<2.1 cm) with normal inspiratory  collapse (>50%) consistent with normal right atrial pressure.  Pericardium / Pleura  There is no pericardial effusion.

## 2019-08-01 LAB
ANION GAP SERPL CALC-SCNC: 13 MMOL/L — SIGNIFICANT CHANGE UP (ref 5–17)
BASOPHILS # BLD AUTO: 0.03 K/UL — SIGNIFICANT CHANGE UP (ref 0–0.2)
BASOPHILS NFR BLD AUTO: 0.2 % — SIGNIFICANT CHANGE UP (ref 0–2)
BUN SERPL-MCNC: 12 MG/DL — SIGNIFICANT CHANGE UP (ref 7–23)
CALCIUM SERPL-MCNC: 8.6 MG/DL — SIGNIFICANT CHANGE UP (ref 8.4–10.5)
CHLORIDE SERPL-SCNC: 99 MMOL/L — SIGNIFICANT CHANGE UP (ref 96–108)
CO2 SERPL-SCNC: 24 MMOL/L — SIGNIFICANT CHANGE UP (ref 22–31)
CREAT SERPL-MCNC: 0.95 MG/DL — SIGNIFICANT CHANGE UP (ref 0.5–1.3)
EOSINOPHIL # BLD AUTO: 0.03 K/UL — SIGNIFICANT CHANGE UP (ref 0–0.5)
EOSINOPHIL NFR BLD AUTO: 0.2 % — SIGNIFICANT CHANGE UP (ref 0–6)
GLUCOSE BLDC GLUCOMTR-MCNC: 104 MG/DL — HIGH (ref 70–99)
GLUCOSE BLDC GLUCOMTR-MCNC: 118 MG/DL — HIGH (ref 70–99)
GLUCOSE BLDC GLUCOMTR-MCNC: 119 MG/DL — HIGH (ref 70–99)
GLUCOSE BLDC GLUCOMTR-MCNC: 120 MG/DL — HIGH (ref 70–99)
GLUCOSE SERPL-MCNC: 122 MG/DL — HIGH (ref 70–99)
HCT VFR BLD CALC: 31.2 % — LOW (ref 39–50)
HGB BLD-MCNC: 10.7 G/DL — LOW (ref 13–17)
IMM GRANULOCYTES NFR BLD AUTO: 0.6 % — SIGNIFICANT CHANGE UP (ref 0–1.5)
LYMPHOCYTES # BLD AUTO: 17.9 % — SIGNIFICANT CHANGE UP (ref 13–44)
LYMPHOCYTES # BLD AUTO: 2.2 K/UL — SIGNIFICANT CHANGE UP (ref 1–3.3)
MCHC RBC-ENTMCNC: 30.3 PG — SIGNIFICANT CHANGE UP (ref 27–34)
MCHC RBC-ENTMCNC: 34.3 GM/DL — SIGNIFICANT CHANGE UP (ref 32–36)
MCV RBC AUTO: 88.4 FL — SIGNIFICANT CHANGE UP (ref 80–100)
MONOCYTES # BLD AUTO: 0.93 K/UL — HIGH (ref 0–0.9)
MONOCYTES NFR BLD AUTO: 7.5 % — SIGNIFICANT CHANGE UP (ref 2–14)
NEUTROPHILS # BLD AUTO: 9.06 K/UL — HIGH (ref 1.8–7.4)
NEUTROPHILS NFR BLD AUTO: 73.6 % — SIGNIFICANT CHANGE UP (ref 43–77)
NRBC # BLD: 0 /100 WBCS — SIGNIFICANT CHANGE UP (ref 0–0)
PLATELET # BLD AUTO: 168 K/UL — SIGNIFICANT CHANGE UP (ref 150–400)
POTASSIUM SERPL-MCNC: 3.7 MMOL/L — SIGNIFICANT CHANGE UP (ref 3.5–5.3)
POTASSIUM SERPL-SCNC: 3.7 MMOL/L — SIGNIFICANT CHANGE UP (ref 3.5–5.3)
RBC # BLD: 3.53 M/UL — LOW (ref 4.2–5.8)
RBC # FLD: 11.8 % — SIGNIFICANT CHANGE UP (ref 10.3–14.5)
SODIUM SERPL-SCNC: 136 MMOL/L — SIGNIFICANT CHANGE UP (ref 135–145)
WBC # BLD: 12.32 K/UL — HIGH (ref 3.8–10.5)
WBC # FLD AUTO: 12.32 K/UL — HIGH (ref 3.8–10.5)

## 2019-08-01 PROCEDURE — 71045 X-RAY EXAM CHEST 1 VIEW: CPT | Mod: 26,76

## 2019-08-01 RX ORDER — MAGNESIUM OXIDE 400 MG ORAL TABLET 241.3 MG
800 TABLET ORAL ONCE
Refills: 0 | Status: COMPLETED | OUTPATIENT
Start: 2019-08-01 | End: 2019-08-01

## 2019-08-01 RX ORDER — METOCLOPRAMIDE HCL 10 MG
10 TABLET ORAL ONCE
Refills: 0 | Status: COMPLETED | OUTPATIENT
Start: 2019-08-01 | End: 2019-08-01

## 2019-08-01 RX ORDER — ALBUMIN HUMAN 25 %
250 VIAL (ML) INTRAVENOUS ONCE
Refills: 0 | Status: COMPLETED | OUTPATIENT
Start: 2019-08-01 | End: 2019-08-01

## 2019-08-01 RX ORDER — METOPROLOL TARTRATE 50 MG
5 TABLET ORAL ONCE
Refills: 0 | Status: COMPLETED | OUTPATIENT
Start: 2019-08-01 | End: 2019-08-01

## 2019-08-01 RX ORDER — METOPROLOL TARTRATE 50 MG
12.5 TABLET ORAL EVERY 6 HOURS
Refills: 0 | Status: DISCONTINUED | OUTPATIENT
Start: 2019-08-01 | End: 2019-08-02

## 2019-08-01 RX ORDER — POTASSIUM CHLORIDE 20 MEQ
40 PACKET (EA) ORAL ONCE
Refills: 0 | Status: COMPLETED | OUTPATIENT
Start: 2019-08-01 | End: 2019-08-01

## 2019-08-01 RX ADMIN — CHLORHEXIDINE GLUCONATE 1 APPLICATION(S): 213 SOLUTION TOPICAL at 06:21

## 2019-08-01 RX ADMIN — PANTOPRAZOLE SODIUM 40 MILLIGRAM(S): 20 TABLET, DELAYED RELEASE ORAL at 06:21

## 2019-08-01 RX ADMIN — SODIUM CHLORIDE 3 MILLILITER(S): 9 INJECTION INTRAMUSCULAR; INTRAVENOUS; SUBCUTANEOUS at 06:20

## 2019-08-01 RX ADMIN — Medication 100 MILLIGRAM(S): at 13:32

## 2019-08-01 RX ADMIN — SODIUM CHLORIDE 3 MILLILITER(S): 9 INJECTION INTRAMUSCULAR; INTRAVENOUS; SUBCUTANEOUS at 13:30

## 2019-08-01 RX ADMIN — Medication 5 MILLIGRAM(S): at 17:10

## 2019-08-01 RX ADMIN — SODIUM CHLORIDE 3 MILLILITER(S): 9 INJECTION INTRAMUSCULAR; INTRAVENOUS; SUBCUTANEOUS at 21:05

## 2019-08-01 RX ADMIN — Medication 12.5 MILLIGRAM(S): at 06:19

## 2019-08-01 RX ADMIN — Medication 125 MILLILITER(S): at 17:44

## 2019-08-01 RX ADMIN — HEPARIN SODIUM 7500 UNIT(S): 5000 INJECTION INTRAVENOUS; SUBCUTANEOUS at 06:19

## 2019-08-01 RX ADMIN — ATORVASTATIN CALCIUM 80 MILLIGRAM(S): 80 TABLET, FILM COATED ORAL at 21:04

## 2019-08-01 RX ADMIN — HEPARIN SODIUM 7500 UNIT(S): 5000 INJECTION INTRAVENOUS; SUBCUTANEOUS at 13:39

## 2019-08-01 RX ADMIN — HEPARIN SODIUM 7500 UNIT(S): 5000 INJECTION INTRAVENOUS; SUBCUTANEOUS at 21:04

## 2019-08-01 RX ADMIN — OXYCODONE AND ACETAMINOPHEN 2 TABLET(S): 5; 325 TABLET ORAL at 00:30

## 2019-08-01 RX ADMIN — Medication 40 MILLIEQUIVALENT(S): at 10:52

## 2019-08-01 RX ADMIN — Medication 2000 MILLIGRAM(S): at 07:52

## 2019-08-01 RX ADMIN — TICAGRELOR 90 MILLIGRAM(S): 90 TABLET ORAL at 06:52

## 2019-08-01 RX ADMIN — AMLODIPINE BESYLATE 2.5 MILLIGRAM(S): 2.5 TABLET ORAL at 06:21

## 2019-08-01 RX ADMIN — TICAGRELOR 90 MILLIGRAM(S): 90 TABLET ORAL at 18:18

## 2019-08-01 RX ADMIN — Medication 81 MILLIGRAM(S): at 10:52

## 2019-08-01 RX ADMIN — POLYETHYLENE GLYCOL 3350 17 GRAM(S): 17 POWDER, FOR SOLUTION ORAL at 10:52

## 2019-08-01 RX ADMIN — Medication 12.5 MILLIGRAM(S): at 18:18

## 2019-08-01 RX ADMIN — Medication 125 MILLILITER(S): at 15:01

## 2019-08-01 RX ADMIN — Medication 100 MILLIGRAM(S): at 06:19

## 2019-08-01 RX ADMIN — MAGNESIUM OXIDE 400 MG ORAL TABLET 800 MILLIGRAM(S): 241.3 TABLET ORAL at 10:52

## 2019-08-01 RX ADMIN — Medication 10 MILLIGRAM(S): at 12:19

## 2019-08-01 NOTE — PROGRESS NOTE ADULT - ASSESSMENT
44 year old male with no prior medical history who presented to [facility] ED on 7/26/19 with chest pain and tightness x 2 days with associated diaphoresis and n/v. He was r/i for STEMI upon presentation, ASA/Brilinta loaded and sent to cath lab where he underwent JESSICA to mRCA with additional finding of severe 3vCAD with LM disease.  Dr. Raymond Cooper, Cardiothoracic Surgery, consulted for surgical evaluation and deemed a good surgical candidate. On 7/30/2019, patient underwent uncomplicated Off-pump coronary artery bypass grafting x3 (OPCAB) (LIMA-LAD-Diag, Radial a. - OM),  EF 50%. Patient transferred to CTICU in stable condition, on no drips, and extubated POD0. On POD1, patient delined and transferred to telemetry stepdown unit. CCB introduced to reduce vasospasm in the setting of radial graft harvest, tolerating well. Today is POD2, all mediastinal and pleural tubes have been removed with follow up CXR remaining stable without evidence of pneumothorax. Patient is ambulating on room air, improving.     A/P:  Neurovascular: No delirium. Pain well controlled with current regimen.  - Tylenol PRN for mild pain. Percocet PRN for mod-severe pain.     Cardiovascular: POD2 s/p OPCAB with radial harvest.   - Continue  Aspirin 81mg PO qd, Atorvastatin 80mg PO qhs, Ticagrelor 90mg PO BID, and Metoprolol 12.5mg PO BID  - Continue Amlodipine 2.5mg PO qd to prevent vasospasm in the setting of radial artery harvest.   - Continue to titrate up BB as tolerates.  - Continue to monitor HR/BP/Tele.      Respiratory: 02 Sat = 98% on RA.  -If on oxygen wean to RA from for O2 Sat > 93%.  -Encourage C+DB and Use of IS 10x / hr while awake.  -CXR lungs clear.   - CXR PA/Lat in AM.     GI: Stable.  -PPX with pantoprazole.   -PO Diet.    Renal / : BUN/Cr: 12/0.95  - Autodiuresing -1.6L thus far.   -Monitor renal function.  -Monitor I/O's.    Endocrine: No acute issues.    -A1c: 5.6  -TSH: 1.197    Hematologic: H&H: 10.7/31.2  -CBC in AM    ID: afebrile.   - WBC: 12.32  -Observe for SIRS/Sepsis Syndrome.    Prophylaxis:  -DVT prophylaxis with 5000 SubQ Heparin q8h.  -SCD's    Disposition:  - Home when medically stable.

## 2019-08-01 NOTE — DIETITIAN INITIAL EVALUATION ADULT. - PERTINENT MEDS FT
Aspirin EC, Lispro, Magnesium oxide, KCl PO, Acetaminophen, Amlodipine, Docusate sodium, Metoprolol tartrate, Oxycodone, Pantoprazole, Miralax, Senna, Atorvastatin

## 2019-08-01 NOTE — DIETITIAN INITIAL EVALUATION ADULT. - ENERGY NEEDS
Height: 5'6" Weight: 109.8 kg, 106.5 kg (7/28), 106.3 kg (7/30), IBW 65 kg+/-10%, %%, BMI 39.1,  IBW used to calculate EER as per pt's current body weight >120% of IBW   Estimated nutrient needs based on [facility] SOC for maintenance in adults adjusted for morbid obesity and post-op OPCAB

## 2019-08-01 NOTE — PROGRESS NOTE ADULT - SUBJECTIVE AND OBJECTIVE BOX
Patient discussed on morning rounds with Dr. Cooper    Operation / Date: 7/30/2019: OPCABGx3 (LIMA-LAD-Diag, Radial a. - OM) x   EF 50%    SUBJECTIVE ASSESSMENT:  44y Male seen and examined bedside. Patient states he feels better today. He is using IS, pulling 1000cc. He has not yet had a BM postop but is passing flatus. Denies chest pain, SOB, palpitations, hemopytsis, N/V/D, abdominal pain, dizziness, fever or chills. Patient is ambulating, tolerating PO diet, and voiding spontaneously.         Vital Signs Last 24 Hrs  T(C): 37.7 (01 Aug 2019 09:44), Max: 38.5 (31 Jul 2019 18:23)  T(F): 99.8 (01 Aug 2019 09:44), Max: 101.3 (31 Jul 2019 18:23)  HR: 116 (01 Aug 2019 12:21) (86 - 116)  BP: 116/88 (01 Aug 2019 12:21) (89/70 - 127/84)  BP(mean): 109 (01 Aug 2019 12:21) (80 - 109)  RR: 18 (01 Aug 2019 12:21) (18 - 20)  SpO2: 96% (01 Aug 2019 12:21) (94% - 100%)  I&O's Detail    31 Jul 2019 07:01  -  01 Aug 2019 07:00  --------------------------------------------------------  IN:    IV PiggyBack: 300 mL    Oral Fluid: 740 mL    sodium chloride 0.9%: 10 mL  Total IN: 1050 mL    OUT:    Chest Tube: 100 mL    Chest Tube: 110 mL    Indwelling Catheter - Urethral: 45 mL    Voided: 2400 mL  Total OUT: 2655 mL    Total NET: -1605 mL      01 Aug 2019 07:01  -  01 Aug 2019 14:45  --------------------------------------------------------  IN:  Total IN: 0 mL    OUT:    Chest Tube: 50 mL    Chest Tube: 100 mL  Total OUT: 150 mL    Total NET: -150 mL          CHEST TUBE: No.   ISABELLE DRAIN:  No.  EPICARDIAL WIRES: Yes.  TIE DOWNS: Yes  DÍAZ: No.    PHYSICAL EXAM:    General: Patient lying comfortably in bed, no acute distress     Neurological: Alert and oriented. No focal neurological deficits     Cardiovascular: S1S2, RRR, no murmurs appreciated on exam     Respiratory: Clear to ausculation bilaterally, no wheezes rales or rhonchi.     Gastrointestinal: Abdomen soft, non tender, non distended     Extremities: Warm and well perfused. No peripheral edema or calf tenderness     Vascular: Peripheral pulses 2+ b/l.    Incision Sites: Mepelex covering midsternotomy incision, dressing appears clean dry and intact. All blakes removed today, with tiedowns in place, covered with clean dry occlusive dressings.     LABS:                        10.7   12.32 )-----------( 168      ( 01 Aug 2019 05:49 )             31.2       COUMADIN:  No.   PT/INR - ( 31 Jul 2019 02:51 )   PT: 14.5 sec;   INR: 1.27          PTT - ( 31 Jul 2019 02:51 )  PTT:35.3 sec    08-01    136  |  99  |  12  ----------------------------<  122<H>  3.7   |  24  |  0.95    Ca    8.6      01 Aug 2019 05:49  Phos  3.5     07-31  Mg     1.9     07-31    TPro  6.0  /  Alb  3.7  /  TBili  1.1  /  DBili  x   /  AST  28  /  ALT  26  /  AlkPhos  54  07-31          MEDICATIONS  (STANDING):  albumin human  5% IVPB 250 milliLiter(s) IV Intermittent once  albumin human  5% IVPB 250 milliLiter(s) IV Intermittent once  amLODIPine   Tablet 2.5 milliGRAM(s) Oral daily  aspirin enteric coated 81 milliGRAM(s) Oral daily  atorvastatin 80 milliGRAM(s) Oral at bedtime  chlorhexidine 2% Cloths 1 Application(s) Topical <User Schedule>  dextrose 5%. 1000 milliLiter(s) (50 mL/Hr) IV Continuous <Continuous>  dextrose 50% Injectable 12.5 Gram(s) IV Push once  dextrose 50% Injectable 25 Gram(s) IV Push once  dextrose 50% Injectable 25 Gram(s) IV Push once  docusate sodium 100 milliGRAM(s) Oral three times a day  heparin  Injectable 7500 Unit(s) SubCutaneous every 8 hours  insulin lispro (HumaLOG) corrective regimen sliding scale   SubCutaneous Before meals and at bedtime  metoprolol tartrate 12.5 milliGRAM(s) Oral two times a day  pantoprazole    Tablet 40 milliGRAM(s) Oral before breakfast  polyethylene glycol 3350 17 Gram(s) Oral daily  senna 2 Tablet(s) Oral at bedtime  sodium chloride 0.9% lock flush 3 milliLiter(s) IV Push every 8 hours  ticagrelor 90 milliGRAM(s) Oral every 12 hours    MEDICATIONS  (PRN):  acetaminophen   Tablet .. 650 milliGRAM(s) Oral every 6 hours PRN Mild Pain (1 - 3)  dextrose 40% Gel 15 Gram(s) Oral once PRN Blood Glucose LESS THAN 70 milliGRAM(s)/deciliter  glucagon  Injectable 1 milliGRAM(s) IntraMuscular once PRN Glucose LESS THAN 70 milligrams/deciliter  oxyCODONE    5 mG/acetaminophen 325 mG 1 Tablet(s) Oral every 4 hours PRN Moderate Pain (4 - 6)  oxyCODONE    5 mG/acetaminophen 325 mG 2 Tablet(s) Oral every 6 hours PRN Severe Pain (7 - 10)        RADIOLOGY & ADDITIONAL TESTS:  < from: Xray Chest 1 View-PORTABLE IMMEDIATE (08.01.19 @ 11:07) >    EXAM:  XR CHEST PORTABLE IMMED 1V                          PROCEDURE DATE:  08/01/2019          INTERPRETATION:  Portable Chest x-ray    History: Chest pain, ST elevation.    Prior studies: Radiograph 8/1/2019    Findings:  The lungs are clear.  There are no pleural effusions or   pneumothorax. Enlarged cardiomediastinal silhouette. Status post median   sternotomy.    Impression: Clear lungs.    < end of copied text >

## 2019-08-02 ENCOUNTER — TRANSCRIPTION ENCOUNTER (OUTPATIENT)
Age: 44
End: 2019-08-02

## 2019-08-02 VITALS — TEMPERATURE: 99 F

## 2019-08-02 LAB
ANION GAP SERPL CALC-SCNC: 13 MMOL/L — SIGNIFICANT CHANGE UP (ref 5–17)
BUN SERPL-MCNC: 11 MG/DL — SIGNIFICANT CHANGE UP (ref 7–23)
CALCIUM SERPL-MCNC: 9 MG/DL — SIGNIFICANT CHANGE UP (ref 8.4–10.5)
CHLORIDE SERPL-SCNC: 101 MMOL/L — SIGNIFICANT CHANGE UP (ref 96–108)
CO2 SERPL-SCNC: 22 MMOL/L — SIGNIFICANT CHANGE UP (ref 22–31)
CREAT SERPL-MCNC: 0.9 MG/DL — SIGNIFICANT CHANGE UP (ref 0.5–1.3)
GLUCOSE BLDC GLUCOMTR-MCNC: 125 MG/DL — HIGH (ref 70–99)
GLUCOSE SERPL-MCNC: 116 MG/DL — HIGH (ref 70–99)
HCT VFR BLD CALC: 31.1 % — LOW (ref 39–50)
HGB BLD-MCNC: 10.4 G/DL — LOW (ref 13–17)
MAGNESIUM SERPL-MCNC: 1.8 MG/DL — SIGNIFICANT CHANGE UP (ref 1.6–2.6)
MCHC RBC-ENTMCNC: 30.1 PG — SIGNIFICANT CHANGE UP (ref 27–34)
MCHC RBC-ENTMCNC: 33.4 GM/DL — SIGNIFICANT CHANGE UP (ref 32–36)
MCV RBC AUTO: 89.9 FL — SIGNIFICANT CHANGE UP (ref 80–100)
NRBC # BLD: 0 /100 WBCS — SIGNIFICANT CHANGE UP (ref 0–0)
PLATELET # BLD AUTO: 181 K/UL — SIGNIFICANT CHANGE UP (ref 150–400)
POTASSIUM SERPL-MCNC: 4.1 MMOL/L — SIGNIFICANT CHANGE UP (ref 3.5–5.3)
POTASSIUM SERPL-SCNC: 4.1 MMOL/L — SIGNIFICANT CHANGE UP (ref 3.5–5.3)
RBC # BLD: 3.46 M/UL — LOW (ref 4.2–5.8)
RBC # FLD: 11.7 % — SIGNIFICANT CHANGE UP (ref 10.3–14.5)
SODIUM SERPL-SCNC: 136 MMOL/L — SIGNIFICANT CHANGE UP (ref 135–145)
WBC # BLD: 14.98 K/UL — HIGH (ref 3.8–10.5)
WBC # FLD AUTO: 14.98 K/UL — HIGH (ref 3.8–10.5)

## 2019-08-02 PROCEDURE — 71046 X-RAY EXAM CHEST 2 VIEWS: CPT | Mod: 26

## 2019-08-02 RX ORDER — ACETAMINOPHEN 500 MG
2 TABLET ORAL
Qty: 0 | Refills: 0 | DISCHARGE
Start: 2019-08-02

## 2019-08-02 RX ORDER — PANTOPRAZOLE SODIUM 20 MG/1
1 TABLET, DELAYED RELEASE ORAL
Qty: 30 | Refills: 0
Start: 2019-08-02 | End: 2019-08-31

## 2019-08-02 RX ORDER — MAGNESIUM OXIDE 400 MG ORAL TABLET 241.3 MG
800 TABLET ORAL ONCE
Refills: 0 | Status: COMPLETED | OUTPATIENT
Start: 2019-08-02 | End: 2019-08-02

## 2019-08-02 RX ORDER — DOCUSATE SODIUM 100 MG
1 CAPSULE ORAL
Qty: 21 | Refills: 0
Start: 2019-08-02 | End: 2019-08-08

## 2019-08-02 RX ORDER — TICAGRELOR 90 MG/1
1 TABLET ORAL
Qty: 60 | Refills: 0
Start: 2019-08-02 | End: 2019-08-31

## 2019-08-02 RX ORDER — ASPIRIN/CALCIUM CARB/MAGNESIUM 324 MG
1 TABLET ORAL
Qty: 30 | Refills: 0
Start: 2019-08-02 | End: 2019-08-31

## 2019-08-02 RX ORDER — SENNA PLUS 8.6 MG/1
2 TABLET ORAL
Qty: 14 | Refills: 0
Start: 2019-08-02 | End: 2019-08-08

## 2019-08-02 RX ORDER — METOPROLOL TARTRATE 50 MG
1 TABLET ORAL
Qty: 60 | Refills: 0
Start: 2019-08-02 | End: 2019-08-31

## 2019-08-02 RX ORDER — AMLODIPINE BESYLATE 2.5 MG/1
1 TABLET ORAL
Qty: 30 | Refills: 0
Start: 2019-08-02 | End: 2019-08-31

## 2019-08-02 RX ORDER — ATORVASTATIN CALCIUM 80 MG/1
1 TABLET, FILM COATED ORAL
Qty: 30 | Refills: 0
Start: 2019-08-02 | End: 2019-08-31

## 2019-08-02 RX ADMIN — TICAGRELOR 90 MILLIGRAM(S): 90 TABLET ORAL at 05:29

## 2019-08-02 RX ADMIN — CHLORHEXIDINE GLUCONATE 1 APPLICATION(S): 213 SOLUTION TOPICAL at 05:37

## 2019-08-02 RX ADMIN — SODIUM CHLORIDE 3 MILLILITER(S): 9 INJECTION INTRAMUSCULAR; INTRAVENOUS; SUBCUTANEOUS at 05:26

## 2019-08-02 RX ADMIN — PANTOPRAZOLE SODIUM 40 MILLIGRAM(S): 20 TABLET, DELAYED RELEASE ORAL at 05:30

## 2019-08-02 RX ADMIN — Medication 100 MILLIGRAM(S): at 13:37

## 2019-08-02 RX ADMIN — AMLODIPINE BESYLATE 2.5 MILLIGRAM(S): 2.5 TABLET ORAL at 05:29

## 2019-08-02 RX ADMIN — Medication 12.5 MILLIGRAM(S): at 05:29

## 2019-08-02 RX ADMIN — HEPARIN SODIUM 7500 UNIT(S): 5000 INJECTION INTRAVENOUS; SUBCUTANEOUS at 05:28

## 2019-08-02 RX ADMIN — MAGNESIUM OXIDE 400 MG ORAL TABLET 800 MILLIGRAM(S): 241.3 TABLET ORAL at 09:13

## 2019-08-02 RX ADMIN — Medication 12.5 MILLIGRAM(S): at 11:17

## 2019-08-02 RX ADMIN — OXYCODONE AND ACETAMINOPHEN 1 TABLET(S): 5; 325 TABLET ORAL at 10:00

## 2019-08-02 RX ADMIN — Medication 12.5 MILLIGRAM(S): at 01:10

## 2019-08-02 RX ADMIN — HEPARIN SODIUM 7500 UNIT(S): 5000 INJECTION INTRAVENOUS; SUBCUTANEOUS at 13:37

## 2019-08-02 RX ADMIN — SODIUM CHLORIDE 3 MILLILITER(S): 9 INJECTION INTRAMUSCULAR; INTRAVENOUS; SUBCUTANEOUS at 13:40

## 2019-08-02 RX ADMIN — Medication 81 MILLIGRAM(S): at 11:17

## 2019-08-02 RX ADMIN — POLYETHYLENE GLYCOL 3350 17 GRAM(S): 17 POWDER, FOR SOLUTION ORAL at 11:17

## 2019-08-02 RX ADMIN — OXYCODONE AND ACETAMINOPHEN 1 TABLET(S): 5; 325 TABLET ORAL at 09:11

## 2019-08-02 NOTE — DISCHARGE NOTE NURSING/CASE MANAGEMENT/SOCIAL WORK - NSDCDPATPORTLINK_GEN_ALL_CORE
You can access the [facility] Patient Portal, offered by [facility], by registering with the following website: [email or web address]

## 2019-08-02 NOTE — DISCHARGE NOTE PROVIDER - HOSPITAL COURSE
44 year old male with no prior medical history who presented to [facility] ED on 7/26/19 with chest pain and tightness x 2 days with associated diaphoresis and n/v. He was r/i for STEMI upon presentation, ASA/Brilinta loaded and sent to cath lab where he underwent JESSICA to mRCA with additional finding of severe 3vCAD with LM disease.  Dr. Raymond Cooper, Cardiothoracic Surgery, consulted for surgical evaluation and deemed a good surgical candidate. On 7/30/2019, patient underwent uncomplicated Off-pump coronary artery bypass grafting x3 (OPCAB) (LIMA-LAD-Diag, Radial a. - OM),  EF 50%. Patient transferred to CTICU in stable condition, on no drips, and extubated POD0. On POD1, patient delined and transferred to telemetry stepdown unit. CCB introduced to reduce vasospasm in the setting of radial graft harvest, tolerating well. POD2, all mediastinal and pleural tubes removed with follow up CXR remaining stable without evidence of pneumothorax. Today is POD3, epicardial pacing wires were removed, patient worked with physical therapy and is ambulating multiple times in the halls on room air. He is tolerating Aspirin, Ticagrelor (as part of dual anti-platelet), BB, CCB, and statin. ACEi should be considered and should be revisited as outpatient, as patient's BP could not tolerate initiation of ACEi at this time. Patient is moving his bowels postoperatively, last BM was this AM. He is using IS, pulling 1000cc. Patient evaluated this AM, and per Dr. Cooper is medically stable for discharge home today.     35 minutes was spent with the patient reviewing the discharge material including medications, follow up appointments, recovery, concerning symptoms, and how to contact their health care providers if they have questions

## 2019-08-02 NOTE — DISCHARGE NOTE PROVIDER - INSTRUCTIONS
Continue a diet low in sodium and low in cholesterol, otherwise known as a DASH Diet (Dietary Approaches to Stop Hypertension).  Eat more fruits, vegetables, and low-fat dairy foods. Cut back on foods that are high in saturated fat, cholesterol, and trans fats.

## 2019-08-02 NOTE — DISCHARGE NOTE PROVIDER - CARE PROVIDERS DIRECT ADDRESSES
[email or web address]

## 2019-08-02 NOTE — PROGRESS NOTE ADULT - PROVIDER SPECIALTY LIST ADULT
CCU
CT Surgery
Critical Care
Critical Care
Intervent Cardiology
Intervent Cardiology
CT Surgery

## 2019-08-02 NOTE — DISCHARGE NOTE PROVIDER - NSDCFUADDAPPT_GEN_ALL_CORE_FT
-Please follow up with Dr. Cooper on 8/9/19 at 9:00AM.  The office is located at [facility], [facility], 4th floor. Call us with any questions #[phone].  - Please also follow up with referring physician, Dr. Nathan Zarate, within 1-2 weeks from the date of discharge. Please call their office on Monday to schedule a follow up appointment.

## 2019-08-02 NOTE — DISCHARGE NOTE PROVIDER - CARE PROVIDER_API CALL
Raymond Cooper)  Cardiovascular Surgery  [address], [address]th [place] [address]  Phone: [phone]  Fax: [phone]  Follow Up Time:     Nathan Zarate)  Cardiology; Interventional Cardiology  [address] [place] [address]  Phone: [phone]  Fax: [phone]  Follow Up Time:

## 2019-08-02 NOTE — PROGRESS NOTE ADULT - ASSESSMENT
-Please follow up with Dr. Cooper on 8/9/19 at 9:00AM.  The office is located at [facility], [facility], 4th floor. Call us with any questions #[phone].  - Please also follow up with referring physician, Dr. Nathan Zarate, within 1-2 weeks from the date of discharge. Please call their office on Monday to schedule a follow up appointment.  Continue a diet low in sodium and low in cholesterol, otherwise known as a DASH Diet (Dietary Approaches to Stop Hypertension).  Eat more fruits, vegetables, and low-fat dairy foods. Cut back on foods that are high in saturated fat, cholesterol, and trans fats.  -Walk daily as tolerated and use your incentive spirometer every hour.  -No driving or strenuous activity/exercise for 6 weeks, or until cleared by your surgeon.  -Gently clean your incisions with anti-bacterial soap and water, pat dry.  You may leave them open to air.  -Call your doctor if you have shortness of breath, chest pain not relieved by pain medication, dizziness, fever >101.5, or increased redness or drainage from incisions.

## 2019-08-02 NOTE — CHART NOTE - NSCHARTNOTEFT_GEN_A_CORE
As per Dr. Cooper, epicardial pacing wires removed prior to discharge. Atrial and ventricular epicardial wires cleansed with chlorhexadine and pulled. Patient tolerated procedure well and remained hemodynamically stable.

## 2019-08-02 NOTE — DISCHARGE NOTE PROVIDER - NSDCCPCAREPLAN_GEN_ALL_CORE_FT
PRINCIPAL DISCHARGE DIAGNOSIS  Diagnosis: ST elevation myocardial infarction (STEMI), unspecified artery  Assessment and Plan of Treatment:

## 2019-08-02 NOTE — DISCHARGE NOTE NURSING/CASE MANAGEMENT/SOCIAL WORK - NSDCPETBCESMAN_GEN_ALL_CORE
02/13/18 0305   Type of Assessment   Assessment Yes   Patient History   Pulmonary Diagnosis none   Surgical Procedures none   Home O2 No   Home Treatments/Frequency No   COPD Risk Screening   Do you have a history of COPD? No   COPD Population Screener   During the past 4 weeks, how much did you feel short of breath? 0   Do you ever cough up any mucus or phlegm? 0   In the past 12 months, you do less than you used to because of your breathing problems 0   Have you smoked at least 100 cigarettes in your entire life? 0   How old are you? 0   COPD Screening Score 0   Smoking History   Have you Ever Smoked Never   Level Of Consciousness   Level of Consciousness Alert   Chest Exam   Respiration 16   Pulse (!) 101   Oximetry   #Pulse Oximetry (Single Determination) Yes   Oxygen   Home O2 Use Prior To Admission? No   Pulse Oximetry 99 %   O2 (LPM) 0   O2 Daily Delivery Respiratory  Room Air with O2 Available   Room Air Challenge Pass      If you are a smoker, it is important for your health to stop smoking. Please be aware that second hand smoke is also harmful.

## 2019-08-02 NOTE — DISCHARGE NOTE NURSING/CASE MANAGEMENT/SOCIAL WORK - NSDCFUADDAPPT_GEN_ALL_CORE_FT
-Please follow up with Dr. Cooper on 8/9/19 at 9:00AM.  The office is located at [facility], [facility], 4th floor. Call us with any questions #[phone].  - Please also follow up with referring physician, Dr. Nathan Zarate, within 1-2 weeks from the date of discharge. Please call their office on Monday to schedule a follow up appointment.

## 2019-08-02 NOTE — PROGRESS NOTE ADULT - SUBJECTIVE AND OBJECTIVE BOX
Patient discussed on morning rounds with Dr. Cooper    Operation / Date: 7/30/2019: OPCABGx3 (LIMA-LAD-Diag, Radial a. - OM) x   EF 50%    Surgeon: Dr. Raymond Cooper    Referring Physician: Dr. Nathan Zarate    SUBJECTIVE ASSESSMENT  44y Male seen and examined bedside. Patient complains that it is difficult to take a deep breath, however he is ambulating multiple times on room air and practiced stairs with physical therapy today. He is going to use IS more, currently pulling 1000cc. Denies chest pain, SOB, palpitations, hemopytsis, N/V/D, abdominal pain, dizziness, fever or chills. Patient is ambulating, tolerating PO diet, and voiding spontaneously. Last BM was this AM.      AND HOSPITAL COURSE:  44 year old male with no prior medical history who presented to [facility] ED on 7/26/19 with chest pain and tightness x 2 days with associated diaphoresis and n/v. He was r/i for STEMI upon presentation, ASA/Brilinta loaded and sent to cath lab where he underwent JESSICA to mRCA with additional finding of severe 3vCAD with LM disease.  Dr. Raymond Cooper, Cardiothoracic Surgery, consulted for surgical evaluation and deemed a good surgical candidate. On 7/30/2019, patient underwent uncomplicated Off-pump coronary artery bypass grafting x3 (OPCAB) (LIMA-LAD-Diag, Radial a. - OM),  EF 50%. Patient transferred to CTICU in stable condition, on no drips, and extubated POD0. On POD1, patient delined and transferred to telemetry stepdown unit. CCB introduced to reduce vasospasm in the setting of radial graft harvest, tolerating well. POD2, all mediastinal and pleural tubes removed with follow up CXR remaining stable without evidence of pneumothorax. Today is POD3, epicardial pacing wires were removed, patient worked with physical therapy and is ambulating multiple times in the halls on room air. He is tolerating Aspirin, Ticagrelor (as part of dual anti-platelet), BB, CCB, and statin. ACEi should be considered and should be revisited as outpatient, as patient's BP could not tolerate initiation of ACEi at this time. Patient is moving his bowels postoperatively, last BM was this AM. He is using IS, pulling 1000cc. Patient evaluated this AM, and per Dr. Cooper is medically stable for discharge home today.   35 minutes was spent with the patient reviewing the discharge material including medications, follow up appointments, recovery, concerning symptoms, and how to contact their health care providers if they have questions      Vital Signs Last 24 Hrs  T(C): 36.5 (02 Aug 2019 10:05), Max: 37.9 (02 Aug 2019 01:01)  T(F): 97.7 (02 Aug 2019 10:05), Max: 100.3 (02 Aug 2019 01:01)  HR: 112 (02 Aug 2019 13:02) (92 - 134)  BP: 133/79 (02 Aug 2019 13:02) (112/80 - 139/68)  BP(mean): 98 (02 Aug 2019 13:02) (80 - 112)  RR: 18 (02 Aug 2019 13:02) (15 - 18)  SpO2: 93% (02 Aug 2019 13:02) (93% - 98%)    EPICARDIAL WIRES REMOVED: Yes  TIE DOWNS REMOVED: Yes.    PHYSICAL EXAM:    General: Patient lying comfortably in bed, no acute distress     Neurological: Alert and oriented. No focal neurological deficits     Cardiovascular: S1S2, RRR, no murmurs appreciated on exam     Respiratory: Clear to ausculation bilaterally, no wheezes rales or rhonchi.     Gastrointestinal: Abdomen soft, non tender, non distended     Extremities: Warm and well perfused. No peripheral edema or calf tenderness     Vascular: Peripheral pulses 2+ b/l.    Incision Sites: MSI: c/d/i, no sternal click. Chest tube sites with +steri strips and covered with clean dry occlusive dressing, all tiedowns have been removed. Left radial harvest site is c/d/i.       LABS:                        10.4   14.98 )-----------( 181      ( 02 Aug 2019 05:56 )             31.1       COUMADIN:  No.            08-02    136  |  101  |  11  ----------------------------<  116<H>  4.1   |  22  |  0.90    Ca    9.0      02 Aug 2019 05:56  Mg     1.8     08-02            Discharge CXR: < from: Xray Chest 1 View-PORTABLE IMMEDIATE (08.01.19 @ 11:07) >    EXAM:  XR CHEST PORTABLE IMMED 1V                          PROCEDURE DATE:  08/01/2019          INTERPRETATION:  Portable Chest x-ray    History: Chest pain, ST elevation.    Prior studies: Radiograph 8/1/2019    Findings:  The lungs are clear.  There are no pleural effusions or   pneumothorax. Enlarged cardiomediastinal silhouette. Status post median   sternotomy.    Impression: Clear lungs.    < end of copied text >      Discharge ECHO:  Not indicated after surgery. Patient discussed on morning rounds with Dr. Cooper    Operation / Date: 7/30/2019: OPCABGx3 (LIMA-LAD-Diag, Radial a. - OM) x   EF 50%    Surgeon: Dr. Raymond Cooper    Referring Physician: Dr. Nathan Zarate    SUBJECTIVE ASSESSMENT  44y Male seen and examined bedside. Patient complains that it is difficult to take a deep breath, however he is ambulating multiple times on room air and practiced stairs with physical therapy today. He is going to use IS more, currently pulling 1000cc. Denies chest pain, SOB, palpitations, hemopytsis, N/V/D, abdominal pain, dizziness, fever or chills. Patient is ambulating, tolerating PO diet, and voiding spontaneously. Last BM was this AM.      AND HOSPITAL COURSE:  44 year old male with no prior medical history who presented to [facility] ED on 7/26/19 with chest pain and tightness x 2 days with associated diaphoresis and n/v. He was r/i for STEMI upon presentation, ASA/Brilinta loaded and sent to cath lab where he underwent JESSICA to mRCA with additional finding of severe 3vCAD with LM disease.  Dr. Raymond Cooper, Cardiothoracic Surgery, consulted for surgical evaluation and deemed a good surgical candidate. On 7/30/2019, patient underwent uncomplicated Off-pump coronary artery bypass grafting x3 (OPCAB) (LIMA-LAD-Diag, Radial a. - OM),  EF 50%. Patient transferred to CTICU in stable condition, on no drips, and extubated POD0. On POD1, patient delined and transferred to telemetry stepdown unit. CCB introduced to reduce vasospasm in the setting of radial graft harvest, tolerating well. POD2, all mediastinal and pleural tubes removed with follow up CXR remaining stable without evidence of pneumothorax. Today is POD3, epicardial pacing wires were removed, patient worked with physical therapy and is ambulating multiple times in the halls on room air. He is tolerating Aspirin, Ticagrelor (as part of dual anti-platelet), BB, CCB, and statin. ACEi should be considered and should be revisited as outpatient, as patient's BP could not tolerate initiation of ACEi at this time. Patient is moving his bowels postoperatively, last BM was this AM. He is using IS, pulling 1000cc. Patient evaluated this AM, and per Dr. Cooper is medically stable for discharge home today.   35 minutes was spent with the patient reviewing the discharge material including medications, follow up appointments, recovery, concerning symptoms, and how to contact their health care providers if they have questions    CABG  Aspirin               [ X ] Yes    Beta-Blocker     [X  ] Yes    Statin                 [ X ] Yes       Vital Signs Last 24 Hrs  T(C): 36.5 (02 Aug 2019 10:05), Max: 37.9 (02 Aug 2019 01:01)  T(F): 97.7 (02 Aug 2019 10:05), Max: 100.3 (02 Aug 2019 01:01)  HR: 112 (02 Aug 2019 13:02) (92 - 134)  BP: 133/79 (02 Aug 2019 13:02) (112/80 - 139/68)  BP(mean): 98 (02 Aug 2019 13:02) (80 - 112)  RR: 18 (02 Aug 2019 13:02) (15 - 18)  SpO2: 93% (02 Aug 2019 13:02) (93% - 98%)    EPICARDIAL WIRES REMOVED: Yes  TIE DOWNS REMOVED: Yes.    PHYSICAL EXAM:    General: Patient lying comfortably in bed, no acute distress     Neurological: Alert and oriented. No focal neurological deficits     Cardiovascular: S1S2, RRR, no murmurs appreciated on exam     Respiratory: Clear to ausculation bilaterally, no wheezes rales or rhonchi.     Gastrointestinal: Abdomen soft, non tender, non distended     Extremities: Warm and well perfused. No peripheral edema or calf tenderness     Vascular: Peripheral pulses 2+ b/l.    Incision Sites: MSI: c/d/i, no sternal click. Chest tube sites with +steri strips and covered with clean dry occlusive dressing, all tiedowns have been removed. Left radial harvest site is c/d/i.       LABS:                        10.4   14.98 )-----------( 181      ( 02 Aug 2019 05:56 )             31.1       COUMADIN:  No.            08-02    136  |  101  |  11  ----------------------------<  116<H>  4.1   |  22  |  0.90    Ca    9.0      02 Aug 2019 05:56  Mg     1.8     08-02            Discharge CXR: < from: Xray Chest 1 View-PORTABLE IMMEDIATE (08.01.19 @ 11:07) >    EXAM:  XR CHEST PORTABLE IMMED 1V                          PROCEDURE DATE:  08/01/2019          INTERPRETATION:  Portable Chest x-ray    History: Chest pain, ST elevation.    Prior studies: Radiograph 8/1/2019    Findings:  The lungs are clear.  There are no pleural effusions or   pneumothorax. Enlarged cardiomediastinal silhouette. Status post median   sternotomy.    Impression: Clear lungs.    < end of copied text >      Discharge ECHO:  Not indicated after surgery.

## 2019-08-02 NOTE — DISCHARGE NOTE PROVIDER - NSDCQMACEAOTHER_CARD_A_CORE_FT
Patient needs to be on CCB to prevent vasospasm for radial harvest graft, as well as on beta blockers 2/2 recent CABG. ACEi not started secondary to blood pressure, and will be revisited as outpatient.

## 2019-08-02 NOTE — DISCHARGE NOTE PROVIDER - NSDCCPTREATMENT_GEN_ALL_CORE_FT
PRINCIPAL PROCEDURE  Procedure: OPCAB (off-pump coronary artery bypass)  Findings and Treatment: LIMA-LAD-Diag, Radial- OM EF 50%

## 2019-08-05 RX ORDER — ACETAMINOPHEN 325 MG/1
325 TABLET ORAL EVERY 6 HOURS
Refills: 0 | Status: ACTIVE | COMMUNITY
Start: 2019-08-05

## 2019-08-05 RX ORDER — DOCUSATE SODIUM 100 MG/1
100 CAPSULE ORAL 3 TIMES DAILY
Qty: 90 | Refills: 0 | Status: ACTIVE | COMMUNITY
Start: 2019-08-05

## 2019-08-05 RX ORDER — SENNOSIDES 8.6 MG/1
8.6 TABLET ORAL
Qty: 28 | Refills: 0 | Status: ACTIVE | COMMUNITY
Start: 2019-08-05

## 2019-08-05 RX ORDER — PANTOPRAZOLE 40 MG/1
40 TABLET, DELAYED RELEASE ORAL DAILY
Refills: 0 | Status: ACTIVE | COMMUNITY
Start: 2019-08-05

## 2019-08-05 RX ORDER — OXYCODONE HYDROCHLORIDE AND ACETAMINOPHEN 5; 325 MG/1; MG/1
5-325 TABLET ORAL
Refills: 0 | Status: ACTIVE | COMMUNITY
Start: 2019-08-05

## 2019-08-08 VITALS — HEIGHT: 66.14 IN | BODY MASS INDEX: 38.62 KG/M2 | WEIGHT: 240.3 LBS

## 2019-08-08 PROBLEM — Z82.49 FH: CABG (CORONARY ARTERY BYPASS SURGERY): Status: ACTIVE | Noted: 2019-08-08

## 2019-08-08 PROBLEM — I21.3 STEMI (ST ELEVATION MYOCARDIAL INFARCTION): Status: ACTIVE | Noted: 2019-08-08

## 2019-08-09 ENCOUNTER — APPOINTMENT (OUTPATIENT)
Dept: CARDIOTHORACIC SURGERY | Facility: CLINIC | Age: 44
End: 2019-08-09
Payer: SELF-PAY

## 2019-08-09 VITALS
HEIGHT: 66 IN | SYSTOLIC BLOOD PRESSURE: 131 MMHG | TEMPERATURE: 97.7 F | WEIGHT: 248 LBS | RESPIRATION RATE: 19 BRPM | BODY MASS INDEX: 39.86 KG/M2 | DIASTOLIC BLOOD PRESSURE: 77 MMHG | HEART RATE: 76 BPM | OXYGEN SATURATION: 99 %

## 2019-08-09 DIAGNOSIS — Z82.49 FAMILY HISTORY OF ISCHEMIC HEART DISEASE AND OTHER DISEASES OF THE CIRCULATORY SYSTEM: ICD-10-CM

## 2019-08-09 DIAGNOSIS — I21.3 ST ELEVATION (STEMI) MYOCARDIAL INFARCTION OF UNSPECIFIED SITE: ICD-10-CM

## 2019-08-09 PROCEDURE — 99024 POSTOP FOLLOW-UP VISIT: CPT

## 2019-08-09 RX ORDER — ATORVASTATIN CALCIUM 80 MG/1
80 TABLET, FILM COATED ORAL
Qty: 90 | Refills: 1 | Status: ACTIVE | COMMUNITY
Start: 2019-08-05 | End: 1900-01-01

## 2019-08-09 RX ORDER — AMLODIPINE BESYLATE 2.5 MG/1
2.5 TABLET ORAL DAILY
Qty: 60 | Refills: 1 | Status: ACTIVE | COMMUNITY
Start: 2019-08-05 | End: 1900-01-01

## 2019-08-09 RX ORDER — ASPIRIN 81 MG/1
81 TABLET ORAL
Qty: 30 | Refills: 5 | Status: ACTIVE | COMMUNITY
Start: 2019-08-05 | End: 1900-01-01

## 2019-08-12 ENCOUNTER — APPOINTMENT (OUTPATIENT)
Dept: HEART AND VASCULAR | Facility: CLINIC | Age: 44
End: 2019-08-12
Payer: SELF-PAY

## 2019-08-12 VITALS — OXYGEN SATURATION: 99 % | HEART RATE: 84 BPM | DIASTOLIC BLOOD PRESSURE: 84 MMHG | SYSTOLIC BLOOD PRESSURE: 120 MMHG

## 2019-08-12 DIAGNOSIS — I21.11 ST ELEVATION (STEMI) MYOCARDIAL INFARCTION INVOLVING RIGHT CORONARY ARTERY: ICD-10-CM

## 2019-08-12 DIAGNOSIS — I25.5 ISCHEMIC CARDIOMYOPATHY: ICD-10-CM

## 2019-08-12 DIAGNOSIS — I25.10 ATHEROSCLEROTIC HEART DISEASE OF NATIVE CORONARY ARTERY W/OUT ANGINA PECTORIS: ICD-10-CM

## 2019-08-12 DIAGNOSIS — I50.21 ACUTE SYSTOLIC (CONGESTIVE) HEART FAILURE: ICD-10-CM

## 2019-08-12 DIAGNOSIS — Z82.49 FAMILY HISTORY OF ISCHEMIC HEART DISEASE AND OTHER DISEASES OF THE CIRCULATORY SYSTEM: ICD-10-CM

## 2019-08-12 DIAGNOSIS — I25.10 ATHEROSCLEROTIC HEART DISEASE OF NATIVE CORONARY ARTERY WITHOUT ANGINA PECTORIS: ICD-10-CM

## 2019-08-12 DIAGNOSIS — E78.5 HYPERLIPIDEMIA, UNSPECIFIED: ICD-10-CM

## 2019-08-12 PROCEDURE — 99212 OFFICE O/P EST SF 10 MIN: CPT

## 2019-08-12 RX ORDER — METOPROLOL TARTRATE 25 MG/1
25 TABLET, FILM COATED ORAL
Qty: 60 | Refills: 3 | Status: DISCONTINUED | COMMUNITY
Start: 2019-08-05 | End: 2019-08-12

## 2019-08-12 RX ORDER — METOPROLOL SUCCINATE 50 MG/1
50 TABLET, EXTENDED RELEASE ORAL DAILY
Qty: 90 | Refills: 3 | Status: ACTIVE | COMMUNITY
Start: 2019-08-12 | End: 1900-01-01

## 2019-08-12 NOTE — DISCUSSION/SUMMARY
[___ Month(s)] : [unfilled] month(s) [FreeTextEntry1] : 43 y/o M with CAD, STEMI s/p PCI 7/26/2019 and OP-CABG 7/30/2019, presents for follow up. Reporting good recovery at home and denies any specific symptoms\par \par # CAD STEMI s/p JESSICA and CABG\par Continue ASA, Brilinta\par Continue optimal medical therapy and risk factor modification

## 2019-08-12 NOTE — REASON FOR VISIT
[Follow-Up - From Hospitalization] : follow-up of a recent hospitalization for [Coronary Artery Disease] : coronary artery disease [FreeTextEntry1] : STEMI / PCI follow up

## 2019-08-12 NOTE — PHYSICAL EXAM
[General Appearance - Well Developed] : well developed [Normal Appearance] : normal appearance [Well Groomed] : well groomed [General Appearance - Well Nourished] : well nourished [No Deformities] : no deformities [General Appearance - In No Acute Distress] : no acute distress [Eyelids - No Xanthelasma] : the eyelids demonstrated no xanthelasmas [Normal Conjunctiva] : the conjunctiva exhibited no abnormalities [No Oral Pallor] : no oral pallor [Normal Oral Mucosa] : normal oral mucosa [No Oral Cyanosis] : no oral cyanosis [Normal Jugular Venous A Waves Present] : normal jugular venous A waves present [No Jugular Venous Calderon A Waves] : no jugular venous calderon A waves [Normal Jugular Venous V Waves Present] : normal jugular venous V waves present [Respiration, Rhythm And Depth] : normal respiratory rhythm and effort [Exaggerated Use Of Accessory Muscles For Inspiration] : no accessory muscle use [Auscultation Breath Sounds / Voice Sounds] : lungs were clear to auscultation bilaterally [Heart Rate And Rhythm] : heart rate and rhythm were normal [Heart Sounds] : normal S1 and S2 [Murmurs] : no murmurs present [Abdomen Tenderness] : non-tender [Abdomen Soft] : soft [Abdomen Mass (___ Cm)] : no abdominal mass palpated [Gait - Sufficient For Exercise Testing] : the gait was sufficient for exercise testing [Abnormal Walk] : normal gait [Nail Clubbing] : no clubbing of the fingernails [Petechial Hemorrhages (___cm)] : no petechial hemorrhages [Cyanosis, Localized] : no localized cyanosis [Skin Color & Pigmentation] : normal skin color and pigmentation [No Venous Stasis] : no venous stasis [] : no rash [Skin Lesions] : no skin lesions [No Skin Ulcers] : no skin ulcer [No Xanthoma] : no  xanthoma was observed [Oriented To Time, Place, And Person] : oriented to person, place, and time [Affect] : the affect was normal [Mood] : the mood was normal [No Anxiety] : not feeling anxious

## 2019-08-12 NOTE — HISTORY OF PRESENT ILLNESS
[FreeTextEntry1] : 43 y/o M with PMHx of recent STEMI s/p PCI to culprit RCA, triple vessel disease including Left Main, s/p OP-CABG, (LIMA -> LAD/ Diag, Radial -> OM), EF 50%, discharged on 8/2/2019 now presents to establish Cardiology care and for follow up.\par \par States that he has been recovering well, some mild pain around incision site, healing well. \par Currently on ASA and Brilinta.\par \par

## 2019-08-15 DIAGNOSIS — E66.01 MORBID (SEVERE) OBESITY DUE TO EXCESS CALORIES: ICD-10-CM

## 2019-08-27 PROCEDURE — 36415 COLL VENOUS BLD VENIPUNCTURE: CPT

## 2019-08-27 PROCEDURE — 97530 THERAPEUTIC ACTIVITIES: CPT

## 2019-08-27 PROCEDURE — 84443 ASSAY THYROID STIM HORMONE: CPT

## 2019-08-27 PROCEDURE — 71046 X-RAY EXAM CHEST 2 VIEWS: CPT

## 2019-08-27 PROCEDURE — 82962 GLUCOSE BLOOD TEST: CPT

## 2019-08-27 PROCEDURE — 84100 ASSAY OF PHOSPHORUS: CPT

## 2019-08-27 PROCEDURE — C1725: CPT

## 2019-08-27 PROCEDURE — C1769: CPT

## 2019-08-27 PROCEDURE — 84484 ASSAY OF TROPONIN QUANT: CPT

## 2019-08-27 PROCEDURE — 97161 PT EVAL LOW COMPLEX 20 MIN: CPT

## 2019-08-27 PROCEDURE — 97116 GAIT TRAINING THERAPY: CPT

## 2019-08-27 PROCEDURE — 93005 ELECTROCARDIOGRAM TRACING: CPT

## 2019-08-27 PROCEDURE — C1894: CPT

## 2019-08-27 PROCEDURE — 86850 RBC ANTIBODY SCREEN: CPT

## 2019-08-27 PROCEDURE — 86901 BLOOD TYPING SEROLOGIC RH(D): CPT

## 2019-08-27 PROCEDURE — 82330 ASSAY OF CALCIUM: CPT

## 2019-08-27 PROCEDURE — P9045: CPT

## 2019-08-27 PROCEDURE — 71045 X-RAY EXAM CHEST 1 VIEW: CPT

## 2019-08-27 PROCEDURE — 82553 CREATINE MB FRACTION: CPT

## 2019-08-27 PROCEDURE — 84295 ASSAY OF SERUM SODIUM: CPT

## 2019-08-27 PROCEDURE — 86923 COMPATIBILITY TEST ELECTRIC: CPT

## 2019-08-27 PROCEDURE — 96374 THER/PROPH/DIAG INJ IV PUSH: CPT

## 2019-08-27 PROCEDURE — C1887: CPT

## 2019-08-27 PROCEDURE — 93306 TTE W/DOPPLER COMPLETE: CPT

## 2019-08-27 PROCEDURE — 83735 ASSAY OF MAGNESIUM: CPT

## 2019-08-27 PROCEDURE — 84132 ASSAY OF SERUM POTASSIUM: CPT

## 2019-08-27 PROCEDURE — 82550 ASSAY OF CK (CPK): CPT

## 2019-08-27 PROCEDURE — 80061 LIPID PANEL: CPT

## 2019-08-27 PROCEDURE — 86900 BLOOD TYPING SEROLOGIC ABO: CPT

## 2019-08-27 PROCEDURE — 82803 BLOOD GASES ANY COMBINATION: CPT

## 2019-08-27 PROCEDURE — 81001 URINALYSIS AUTO W/SCOPE: CPT

## 2019-08-27 PROCEDURE — C1874: CPT

## 2019-08-27 PROCEDURE — 85610 PROTHROMBIN TIME: CPT

## 2019-08-27 PROCEDURE — 85025 COMPLETE CBC W/AUTO DIFF WBC: CPT

## 2019-08-27 PROCEDURE — 93880 EXTRACRANIAL BILAT STUDY: CPT

## 2019-08-27 PROCEDURE — 99291 CRITICAL CARE FIRST HOUR: CPT | Mod: 25

## 2019-08-27 PROCEDURE — 85027 COMPLETE CBC AUTOMATED: CPT

## 2019-08-27 PROCEDURE — 80048 BASIC METABOLIC PNL TOTAL CA: CPT

## 2019-08-27 PROCEDURE — C1713: CPT

## 2019-08-27 PROCEDURE — 83036 HEMOGLOBIN GLYCOSYLATED A1C: CPT

## 2019-08-27 PROCEDURE — 94150 VITAL CAPACITY TEST: CPT

## 2019-08-27 PROCEDURE — C1889: CPT

## 2019-08-27 PROCEDURE — 83605 ASSAY OF LACTIC ACID: CPT

## 2019-08-27 PROCEDURE — 80053 COMPREHEN METABOLIC PANEL: CPT

## 2019-08-27 PROCEDURE — 85730 THROMBOPLASTIN TIME PARTIAL: CPT

## 2019-09-10 RX ORDER — CLOPIDOGREL BISULFATE 75 MG/1
75 TABLET, FILM COATED ORAL
Qty: 90 | Refills: 3 | Status: ACTIVE | COMMUNITY
Start: 1900-01-01 | End: 1900-01-01

## 2019-09-10 RX ORDER — TICAGRELOR 90 MG/1
90 TABLET ORAL TWICE DAILY
Qty: 180 | Refills: 3 | Status: DISCONTINUED | COMMUNITY
Start: 2019-08-05 | End: 2019-09-10

## 2019-10-07 ENCOUNTER — MESSAGE (OUTPATIENT)
Age: 44
End: 2019-10-07

## 2019-10-23 ENCOUNTER — MESSAGE (OUTPATIENT)
Age: 44
End: 2019-10-23

## 2020-02-24 ENCOUNTER — APPOINTMENT (OUTPATIENT)
Dept: HEART AND VASCULAR | Facility: CLINIC | Age: 45
End: 2020-02-24

## 2020-12-08 ENCOUNTER — EMERGENCY (EMERGENCY)
Facility: HOSPITAL | Age: 45
LOS: 1 days | Discharge: HOME | End: 2020-12-08
Admitting: EMERGENCY MEDICINE
Payer: MEDICARE

## 2020-12-08 VITALS
SYSTOLIC BLOOD PRESSURE: 160 MMHG | HEIGHT: 66 IN | OXYGEN SATURATION: 99 % | DIASTOLIC BLOOD PRESSURE: 104 MMHG | HEART RATE: 85 BPM | TEMPERATURE: 99 F | RESPIRATION RATE: 17 BRPM

## 2020-12-08 LAB — SARS-COV-2 RNA SPEC QL NAA+PROBE: SIGNIFICANT CHANGE UP

## 2020-12-08 PROCEDURE — 99283 EMERGENCY DEPT VISIT LOW MDM: CPT

## 2020-12-08 PROCEDURE — U0003: CPT

## 2020-12-08 NOTE — ED PROVIDER NOTE - PATIENT PORTAL LINK FT
You can access the FollowMyHealth Patient Portal offered by [facility] by registering at the following website: [email or web address]. By joining [employer]’s FollowMyHealth portal, you will also be able to view your health information using other applications (apps) compatible with our system.

## 2020-12-12 DIAGNOSIS — Z20.828 CONTACT WITH AND (SUSPECTED) EXPOSURE TO OTHER VIRAL COMMUNICABLE DISEASES: ICD-10-CM

## 2020-12-21 ENCOUNTER — EMERGENCY (EMERGENCY)
Facility: HOSPITAL | Age: 45
LOS: 1 days | Discharge: HOME | End: 2020-12-21
Admitting: EMERGENCY MEDICINE
Payer: MEDICARE

## 2020-12-21 VITALS
HEART RATE: 71 BPM | SYSTOLIC BLOOD PRESSURE: 137 MMHG | DIASTOLIC BLOOD PRESSURE: 92 MMHG | RESPIRATION RATE: 16 BRPM | OXYGEN SATURATION: 99 % | TEMPERATURE: 98 F | HEIGHT: 66 IN

## 2020-12-21 DIAGNOSIS — Z20.828 CONTACT WITH AND (SUSPECTED) EXPOSURE TO OTHER VIRAL COMMUNICABLE DISEASES: ICD-10-CM

## 2020-12-21 PROCEDURE — 99283 EMERGENCY DEPT VISIT LOW MDM: CPT

## 2020-12-21 PROCEDURE — U0003: CPT

## 2020-12-21 NOTE — ED PROVIDER NOTE - PATIENT PORTAL LINK FT
You can access the FollowMyHealth Patient Portal offered by [facility] by registering at the following website: [email or web address]. By joining [employer]’s FollowMyHealth portal, you will also be able to view your health information using other applications (apps) compatible with our system.

## 2020-12-22 LAB — SARS-COV-2 RNA SPEC QL NAA+PROBE: SIGNIFICANT CHANGE UP

## 2020-12-28 ENCOUNTER — EMERGENCY (EMERGENCY)
Facility: HOSPITAL | Age: 45
LOS: 1 days | Discharge: HOME | End: 2020-12-28
Admitting: EMERGENCY MEDICINE
Payer: MEDICARE

## 2020-12-28 VITALS
SYSTOLIC BLOOD PRESSURE: 145 MMHG | RESPIRATION RATE: 18 BRPM | TEMPERATURE: 98 F | HEIGHT: 66 IN | OXYGEN SATURATION: 100 % | HEART RATE: 67 BPM | DIASTOLIC BLOOD PRESSURE: 94 MMHG

## 2020-12-28 DIAGNOSIS — Z20.828 CONTACT WITH AND (SUSPECTED) EXPOSURE TO OTHER VIRAL COMMUNICABLE DISEASES: ICD-10-CM

## 2020-12-28 LAB — SARS-COV-2 RNA SPEC QL NAA+PROBE: SIGNIFICANT CHANGE UP

## 2020-12-28 PROCEDURE — 99283 EMERGENCY DEPT VISIT LOW MDM: CPT

## 2020-12-28 PROCEDURE — U0003: CPT

## 2020-12-28 NOTE — ED PROVIDER NOTE - PATIENT PORTAL LINK FT
You can access the FollowMyHealth Patient Portal offered by [facility] by registering at the following website: [email or web address]. By joining [employer]’s FollowMyHealth portal, you will also be able to view your health information using other applications (apps) compatible with our system.

## 2020-12-28 NOTE — ED PROVIDER NOTE - NSFOLLOWUPINSTRUCTIONS_ED_ALL_ED_FT
You will received a text or email with your covid swab results within 24-48 hours.  You can also call back the number deloris on discharge papers for results or access patient portal.    If you have symptoms of covid 19 or were exposed you should quarantine for 14 days minimum or until symptoms resolve  If you have difficulty breathing, chest pain, dizziness, fainting, vomiting or other concerns return to ED

## 2021-12-16 ENCOUNTER — EMERGENCY (EMERGENCY)
Facility: HOSPITAL | Age: 46
LOS: 1 days | Discharge: HOME | End: 2021-12-16
Attending: EMERGENCY MEDICINE | Admitting: EMERGENCY MEDICINE
Payer: COMMERCIAL

## 2021-12-16 VITALS
WEIGHT: 119.93 LBS | SYSTOLIC BLOOD PRESSURE: 164 MMHG | HEIGHT: 66 IN | RESPIRATION RATE: 18 BRPM | HEART RATE: 86 BPM | TEMPERATURE: 98 F | DIASTOLIC BLOOD PRESSURE: 99 MMHG | OXYGEN SATURATION: 97 %

## 2021-12-16 DIAGNOSIS — R10.32 LEFT LOWER QUADRANT PAIN: ICD-10-CM

## 2021-12-16 DIAGNOSIS — K40.90 UNILATERAL INGUINAL HERNIA, WITHOUT OBSTRUCTION OR GANGRENE, NOT SPECIFIED AS RECURRENT: ICD-10-CM

## 2021-12-16 DIAGNOSIS — Z79.82 LONG TERM (CURRENT) USE OF ASPIRIN: ICD-10-CM

## 2021-12-16 LAB
ALBUMIN SERPL ELPH-MCNC: 4.3 G/DL — SIGNIFICANT CHANGE UP (ref 3.3–5)
ALP SERPL-CCNC: 99 U/L — SIGNIFICANT CHANGE UP (ref 40–120)
ALT FLD-CCNC: 19 U/L — SIGNIFICANT CHANGE UP (ref 10–45)
ANION GAP SERPL CALC-SCNC: 7 MMOL/L — SIGNIFICANT CHANGE UP (ref 5–17)
APTT BLD: 36.5 SEC — HIGH (ref 27.5–35.5)
AST SERPL-CCNC: 21 U/L — SIGNIFICANT CHANGE UP (ref 10–40)
BASOPHILS # BLD AUTO: 0.02 K/UL — SIGNIFICANT CHANGE UP (ref 0–0.2)
BASOPHILS NFR BLD AUTO: 0.3 % — SIGNIFICANT CHANGE UP (ref 0–2)
BILIRUB SERPL-MCNC: 0.4 MG/DL — SIGNIFICANT CHANGE UP (ref 0.2–1.2)
BLD GP AB SCN SERPL QL: NEGATIVE — SIGNIFICANT CHANGE UP
BUN SERPL-MCNC: 17 MG/DL — SIGNIFICANT CHANGE UP (ref 7–23)
CALCIUM SERPL-MCNC: 9.5 MG/DL — SIGNIFICANT CHANGE UP (ref 8.4–10.5)
CHLORIDE SERPL-SCNC: 104 MMOL/L — SIGNIFICANT CHANGE UP (ref 96–108)
CO2 SERPL-SCNC: 29 MMOL/L — SIGNIFICANT CHANGE UP (ref 22–31)
CREAT SERPL-MCNC: 0.96 MG/DL — SIGNIFICANT CHANGE UP (ref 0.5–1.3)
EOSINOPHIL # BLD AUTO: 0.13 K/UL — SIGNIFICANT CHANGE UP (ref 0–0.5)
EOSINOPHIL NFR BLD AUTO: 1.7 % — SIGNIFICANT CHANGE UP (ref 0–6)
GLUCOSE SERPL-MCNC: 107 MG/DL — HIGH (ref 70–99)
HCT VFR BLD CALC: 40.9 % — SIGNIFICANT CHANGE UP (ref 39–50)
HGB BLD-MCNC: 13.9 G/DL — SIGNIFICANT CHANGE UP (ref 13–17)
IMM GRANULOCYTES NFR BLD AUTO: 0.1 % — SIGNIFICANT CHANGE UP (ref 0–1.5)
INR BLD: 1.07 — SIGNIFICANT CHANGE UP (ref 0.88–1.16)
LYMPHOCYTES # BLD AUTO: 2.9 K/UL — SIGNIFICANT CHANGE UP (ref 1–3.3)
LYMPHOCYTES # BLD AUTO: 36.9 % — SIGNIFICANT CHANGE UP (ref 13–44)
MCHC RBC-ENTMCNC: 29.4 PG — SIGNIFICANT CHANGE UP (ref 27–34)
MCHC RBC-ENTMCNC: 34 GM/DL — SIGNIFICANT CHANGE UP (ref 32–36)
MCV RBC AUTO: 86.5 FL — SIGNIFICANT CHANGE UP (ref 80–100)
MONOCYTES # BLD AUTO: 0.43 K/UL — SIGNIFICANT CHANGE UP (ref 0–0.9)
MONOCYTES NFR BLD AUTO: 5.5 % — SIGNIFICANT CHANGE UP (ref 2–14)
NEUTROPHILS # BLD AUTO: 4.37 K/UL — SIGNIFICANT CHANGE UP (ref 1.8–7.4)
NEUTROPHILS NFR BLD AUTO: 55.5 % — SIGNIFICANT CHANGE UP (ref 43–77)
NRBC # BLD: 0 /100 WBCS — SIGNIFICANT CHANGE UP (ref 0–0)
PLATELET # BLD AUTO: 188 K/UL — SIGNIFICANT CHANGE UP (ref 150–400)
POTASSIUM SERPL-MCNC: 4.5 MMOL/L — SIGNIFICANT CHANGE UP (ref 3.5–5.3)
POTASSIUM SERPL-SCNC: 4.5 MMOL/L — SIGNIFICANT CHANGE UP (ref 3.5–5.3)
PROT SERPL-MCNC: 6.9 G/DL — SIGNIFICANT CHANGE UP (ref 6–8.3)
PROTHROM AB SERPL-ACNC: 12.8 SEC — SIGNIFICANT CHANGE UP (ref 10.6–13.6)
RBC # BLD: 4.73 M/UL — SIGNIFICANT CHANGE UP (ref 4.2–5.8)
RBC # FLD: 11.5 % — SIGNIFICANT CHANGE UP (ref 10.3–14.5)
RH IG SCN BLD-IMP: POSITIVE — SIGNIFICANT CHANGE UP
SARS-COV-2 RNA SPEC QL NAA+PROBE: NEGATIVE — SIGNIFICANT CHANGE UP
SODIUM SERPL-SCNC: 140 MMOL/L — SIGNIFICANT CHANGE UP (ref 135–145)
WBC # BLD: 7.86 K/UL — SIGNIFICANT CHANGE UP (ref 3.8–10.5)
WBC # FLD AUTO: 7.86 K/UL — SIGNIFICANT CHANGE UP (ref 3.8–10.5)

## 2021-12-16 PROCEDURE — 99284 EMERGENCY DEPT VISIT MOD MDM: CPT | Mod: 25

## 2021-12-16 PROCEDURE — 85610 PROTHROMBIN TIME: CPT

## 2021-12-16 PROCEDURE — 86901 BLOOD TYPING SEROLOGIC RH(D): CPT

## 2021-12-16 PROCEDURE — 87635 SARS-COV-2 COVID-19 AMP PRB: CPT

## 2021-12-16 PROCEDURE — 80053 COMPREHEN METABOLIC PANEL: CPT

## 2021-12-16 PROCEDURE — 74177 CT ABD & PELVIS W/CONTRAST: CPT | Mod: MC

## 2021-12-16 PROCEDURE — 36415 COLL VENOUS BLD VENIPUNCTURE: CPT

## 2021-12-16 PROCEDURE — 74177 CT ABD & PELVIS W/CONTRAST: CPT | Mod: 26,MC

## 2021-12-16 PROCEDURE — 85730 THROMBOPLASTIN TIME PARTIAL: CPT

## 2021-12-16 PROCEDURE — 86850 RBC ANTIBODY SCREEN: CPT

## 2021-12-16 PROCEDURE — 86900 BLOOD TYPING SEROLOGIC ABO: CPT

## 2021-12-16 PROCEDURE — 85025 COMPLETE CBC W/AUTO DIFF WBC: CPT

## 2021-12-16 PROCEDURE — 99285 EMERGENCY DEPT VISIT HI MDM: CPT

## 2021-12-16 RX ORDER — IOHEXOL 300 MG/ML
30 INJECTION, SOLUTION INTRAVENOUS ONCE
Refills: 0 | Status: COMPLETED | OUTPATIENT
Start: 2021-12-16 | End: 2021-12-16

## 2021-12-16 RX ADMIN — IOHEXOL 30 MILLILITER(S): 300 INJECTION, SOLUTION INTRAVENOUS at 22:00

## 2021-12-16 NOTE — ED ADULT NURSE NOTE - OBJECTIVE STATEMENT
Pt presents with left groin pain. hx of hernia to left groin area. no bulging noted. pain with palpation. pt states he tried to reduce the hernia on his on however was unsuccessful. Pt denies any N/V/D denies dysuria or hematuria.

## 2021-12-16 NOTE — ED ADULT TRIAGE NOTE - CHIEF COMPLAINT QUOTE
pt c/o Lt groin pain that started ~1500 today. hx inguinal hernia, states he is usually able to reduce it, today is able to reduce but c/o more pain than usual to Lt groin.

## 2021-12-17 VITALS
SYSTOLIC BLOOD PRESSURE: 154 MMHG | RESPIRATION RATE: 16 BRPM | DIASTOLIC BLOOD PRESSURE: 97 MMHG | TEMPERATURE: 98 F | OXYGEN SATURATION: 98 % | HEART RATE: 68 BPM

## 2021-12-17 NOTE — ED PROVIDER NOTE - CARE PROVIDER_API CALL
Dax Frausto)  ColonRectal Surgery; Surgery  [address], [address]  [place] [address]  Phone: [phone]  Fax: [phone]  Follow Up Time:

## 2021-12-17 NOTE — ED PROVIDER NOTE - NSFOLLOWUPINSTRUCTIONS_ED_ALL_ED_FT
Follow up with surgeon in 2-3 days. Return to ED for increased pain, vomiting, fever, worsening condition.  ACS clinic: [phone]  Dr. Frausto [phone]      Inguinal Hernia    WHAT YOU NEED TO KNOW:    An inguinal hernia happens when organs or abdominal tissue push through a weak spot in the abdominal wall. The abdominal wall is made of fat and muscle. It holds the intestines in place. The hernia may contain fluid, tissue from the abdomen, or part of an organ (such as an intestine).      DISCHARGE INSTRUCTIONS:    Seek care immediately if:   •You have severe abdominal pain with nausea and vomiting.       •Your abdomen is larger than usual.       •Your hernia gets bigger or is purple or blue.       •You see blood in your bowel movements.      •You feel weak, dizzy, or faint.       Contact your healthcare provider if:   •You have a fever.      •You have questions or concerns about your condition or care.      Medicine: You may need the following:   •NSAIDs, such as ibuprofen, help decrease swelling, pain, and fever. NSAIDs can cause stomach bleeding or kidney problems in certain people. If you take blood thinner medicine, always ask your healthcare provider if NSAIDs are safe for you. Always read the medicine label and follow directions.      •Take your medicine as directed. Contact your healthcare provider if you think your medicine is not helping or if you have side effects. Tell him or her if you are allergic to any medicine. Keep a list of the medicines, vitamins, and herbs you take. Include the amounts, and when and why you take them. Bring the list or the pill bottles to follow-up visits. Carry your medicine list with you in case of an emergency.      Follow up with your healthcare provider as directed: Write down your questions so you remember to ask them during your visits.    Manage your symptoms and prevent another hernia:   •Do not lift anything heavy. Heavy lifting can make your hernia worse or cause another hernia. Ask your healthcare provider how much is safe for you to lift.       •Drink liquids as directed. Liquids may prevent constipation and straining during a bowel movement. Ask how much liquid to drink each day and which liquids are best for you.       •Eat foods high in fiber. Fiber may prevent constipation and straining during a bowel movement. Foods that contain fiber include fruits, vegetables, beans, lentils, and whole grains.       •Maintain a healthy weight. If you are overweight, weight loss may prevent your hernia from getting worse. It may also prevent another hernia. Talk to your healthcare provider about exercise and how to lose weight safely if you are overweight.       •Do not smoke. Nicotine and other chemicals in cigarettes and cigars can weaken the abdominal wall. This may increase your risk for another hernia. Ask your healthcare provider for information if you currently smoke and need help to quit. E-cigarettes or smokeless tobacco still contain nicotine. Talk to your healthcare provider before you use these products.          © Copyright [employer] 2021

## 2021-12-17 NOTE — ED PROVIDER NOTE - CPE EDP CARDIAC NORM
normal... no back pain, no gout, no musculoskeletal pain, no neck pain, and no weakness. no back pain, no musculoskeletal pain, no neck pain, and no weakness.

## 2021-12-17 NOTE — ED PROVIDER NOTE - OBJECTIVE STATEMENT
47 y/o M pt with PMHx of L inguinal hernia presents to ED c/o LLQ abdominal pain. Pt's had hernia that's been reducible, but with increased pain intermittently x 2 weeks. He noted that he had a bulge and was able to reduce hernia today, but has persistent pain. Pt is in ED for excruciating pain at hernia site. Pt denies nausea, vomiting, or any other acute complaints, and has normal BM.

## 2021-12-17 NOTE — ED PROVIDER NOTE - CLINICAL SUMMARY MEDICAL DECISION MAKING FREE TEXT BOX
45 y/o M pt with PMHx of hernia presents to ED with LLQ abdominal pain. Plan for labs, CT abd/pelvis r/o acute pathology, given fluids, IV Tylenol, and reassess. 45 y/o M pt with PMHx of hernia presents to ED with LLQ abdominal pain. Plan for labs, CT abd/pelvis r/o acute pathology, given fluids, IV Tylenol, and reassess.  On re-evaluation, pt is AAOx3 and in NAD. Vitals wnl. Instructions given to follow up with surgeon as outpatient, return precautions given. Pt encouraged to use scrotal support when standing for long hours, take NSAIDs as needed for pain.

## 2021-12-28 PROBLEM — K40.90 UNILATERAL INGUINAL HERNIA, WITHOUT OBSTRUCTION OR GANGRENE, NOT SPECIFIED AS RECURRENT: Chronic | Status: ACTIVE | Noted: 2021-12-17

## 2022-01-10 NOTE — ED PROVIDER NOTE - DISPOSITION TYPE
January 10, 2022  EMPLOYEE INFORMATION: EMPLOYER INFORMATION:   NAME: Jose Garcia  POST OFFICE ( ALL SITES)   : 1972 [phone]    DATE OF INJURY/EVENT: 1/10/2022           [facility] [address]   Treating Provider: Eloisa Trinidad MD  Time In:  12:45 PM Time Out:  1:39 PM      DIAGNOSIS:   1. Strain of neck muscle, initial encounter    2. Strain of lumbar region, initial encounter    3. Hip strain, right, initial encounter    4. Strain of right knee and leg, initial encounter    5. Contusion of right knee, initial encounter    6. Ankle strain, right, initial encounter    7. Strain of right shoulder, initial encounter      STATUS: This injury is determined to be WORK RELATED.    RETURN TO WORK:   Employee is unable to return to work.   Reason: Pain,  rest,          RESTRICTIONS: Restrictions are to be followed at work and at home.  Restrictions are in effect until next follow-up visit.  Unable to return to work.  See above for reason.     TREATMENT PLAN:   Medications for this injury/condition: Ibuprofen 200 mg 2 tablets with tylenol 500 mg 1 tablet every 6-8 hours with food as needed   Referral/Consult: None  Diagnostic Testing: None       Instructions: Cool compresses to sore areas about 20 min on, towel to protect skin , can use warm compresses if needed     NEXT RETURN VISIT: Thursday,  at 1:15PM   Thank you for the privilege of providing medical care for this injury/condition.  If there are any questions, please call the occupational health clinic at Dept: [phone].      Electronically signed on 1/10/2022 at 1:37 PM by:   Eloisa Trinidad MD   [facility]     ADMIT

## 2022-04-19 NOTE — ED ADULT TRIAGE NOTE - WEIGHT IN LBS
Arrived ambulatory accompanied by self.  Awake, oriented  X 3.  Reporting right flank pain since last Wednesday.  The pain now radiates around to the front and is associated with nausea.  Pain rated 4/10 now.  Denies urinary sym.   
Patient was advised and in agreement they do not meet Urgent Care criteria based on triage symptoms.   
119.9

## 2022-04-21 NOTE — PROGRESS NOTE ADULT - ASSESSMENT
45yo M with no PMHx (Fhx of father with ACS s/p CABG age ~46yo) presented to the ED with crushing chest pain and left arm pain. Found to have inferior/posterior wall STEMI s/p JESSICA to mid-RCA, now pending CABG on Monday for left main disease. Continue Regimen: Triamcinolone 0.1% ointment PRN flares\\nClaritin QAM\\nZyrtec QHS Detail Level: Zone Plan: Pt advised to f/u with an allergist to rule out environmental allergies if problems persist. Patient hasn’t had a flare including hives since she has started antihistamines. 45yo M with no PMHx (Fhx of father with ACS s/p CABG age ~46yo) who presented to the ED with crushing chest pain and left arm pain, was found to have an inferior/posterior wall STEMI now s/p JESSICA to mid-RCA and pending CABG on Tuesday for left main disease. Render In Strict Bullet Format?: No

## 2022-07-27 ENCOUNTER — TRANSCRIPTION ENCOUNTER (OUTPATIENT)
Age: 47
End: 2022-07-27

## 2022-07-27 VITALS
SYSTOLIC BLOOD PRESSURE: 143 MMHG | DIASTOLIC BLOOD PRESSURE: 83 MMHG | HEART RATE: 64 BPM | RESPIRATION RATE: 16 BRPM | HEIGHT: 66.93 IN | TEMPERATURE: 97 F | OXYGEN SATURATION: 98 % | WEIGHT: 227.74 LBS

## 2022-07-27 NOTE — PRE-OP CHECKLIST - 1.
I received word from the patient and the [facility] in [facility] that the incision was opened 1.5 cm.  CT was performed.  Nurse Practionar notified Dr Donaldson.  Patient has been on antibiotics.  Will have patient come to our clinic on Monday.  Shantanu says he is going to go back tomorrow for dressing change.  He denies fever.  I encouraged him to drink fluids, eat well and get rest.  He will plan to be here on 2pm Monday in clinic.    
EDUARDA STEINBERG[place] [phone]

## 2022-07-28 ENCOUNTER — TRANSCRIPTION ENCOUNTER (OUTPATIENT)
Age: 47
End: 2022-07-28

## 2022-07-28 ENCOUNTER — RESULT REVIEW (OUTPATIENT)
Age: 47
End: 2022-07-28

## 2022-07-28 ENCOUNTER — INPATIENT (INPATIENT)
Facility: HOSPITAL | Age: 47
LOS: 0 days | Discharge: HOME | DRG: 352 | End: 2022-07-29
Attending: SURGERY | Admitting: SURGERY
Payer: COMMERCIAL

## 2022-07-28 DIAGNOSIS — Z95.1 PRESENCE OF AORTOCORONARY BYPASS GRAFT: Chronic | ICD-10-CM

## 2022-07-28 PROCEDURE — 88302 TISSUE EXAM BY PATHOLOGIST: CPT | Mod: 26

## 2022-07-28 DEVICE — MESH HERNIA INGUINAL PROGRIP LAPAROSCOPIC 15 X 10CM LEFT: Type: IMPLANTABLE DEVICE | Site: BILATERAL | Status: FUNCTIONAL

## 2022-07-28 RX ORDER — METOPROLOL TARTRATE 50 MG
1 TABLET ORAL
Qty: 0 | Refills: 0 | DISCHARGE

## 2022-07-28 RX ORDER — ASPIRIN/CALCIUM CARB/MAGNESIUM 324 MG
81 TABLET ORAL ONCE
Refills: 0 | Status: COMPLETED | OUTPATIENT
Start: 2022-07-28 | End: 2022-07-28

## 2022-07-28 RX ORDER — SENNA PLUS 8.6 MG/1
2 TABLET ORAL AT BEDTIME
Refills: 0 | Status: DISCONTINUED | OUTPATIENT
Start: 2022-07-28 | End: 2022-07-29

## 2022-07-28 RX ORDER — ATORVASTATIN CALCIUM 80 MG/1
1 TABLET, FILM COATED ORAL
Qty: 0 | Refills: 0 | DISCHARGE

## 2022-07-28 RX ORDER — HEPARIN SODIUM 5000 [USP'U]/ML
5000 INJECTION INTRAVENOUS; SUBCUTANEOUS EVERY 8 HOURS
Refills: 0 | Status: DISCONTINUED | OUTPATIENT
Start: 2022-07-28 | End: 2022-07-29

## 2022-07-28 RX ORDER — SODIUM CHLORIDE 9 MG/ML
1000 INJECTION, SOLUTION INTRAVENOUS
Refills: 0 | Status: DISCONTINUED | OUTPATIENT
Start: 2022-07-28 | End: 2022-07-29

## 2022-07-28 RX ORDER — METOPROLOL TARTRATE 50 MG
50 TABLET ORAL DAILY
Refills: 0 | Status: DISCONTINUED | OUTPATIENT
Start: 2022-07-29 | End: 2022-07-29

## 2022-07-28 RX ORDER — ONDANSETRON 8 MG/1
4 TABLET, FILM COATED ORAL EVERY 6 HOURS
Refills: 0 | Status: DISCONTINUED | OUTPATIENT
Start: 2022-07-28 | End: 2022-07-29

## 2022-07-28 RX ORDER — HYDROMORPHONE HYDROCHLORIDE 2 MG/ML
0.5 INJECTION INTRAMUSCULAR; INTRAVENOUS; SUBCUTANEOUS ONCE
Refills: 0 | Status: DISCONTINUED | OUTPATIENT
Start: 2022-07-28 | End: 2022-07-29

## 2022-07-28 RX ORDER — ATORVASTATIN CALCIUM 80 MG/1
40 TABLET, FILM COATED ORAL AT BEDTIME
Refills: 0 | Status: DISCONTINUED | OUTPATIENT
Start: 2022-07-28 | End: 2022-07-29

## 2022-07-28 RX ORDER — ACETAMINOPHEN 500 MG
650 TABLET ORAL EVERY 6 HOURS
Refills: 0 | Status: DISCONTINUED | OUTPATIENT
Start: 2022-07-28 | End: 2022-07-29

## 2022-07-28 RX ORDER — ASPIRIN/CALCIUM CARB/MAGNESIUM 324 MG
81 TABLET ORAL DAILY
Refills: 0 | Status: DISCONTINUED | OUTPATIENT
Start: 2022-07-29 | End: 2022-07-29

## 2022-07-28 RX ORDER — OXYCODONE HYDROCHLORIDE 5 MG/1
5 TABLET ORAL EVERY 6 HOURS
Refills: 0 | Status: DISCONTINUED | OUTPATIENT
Start: 2022-07-28 | End: 2022-07-29

## 2022-07-28 RX ORDER — OXYCODONE HYDROCHLORIDE 5 MG/1
2.5 TABLET ORAL EVERY 6 HOURS
Refills: 0 | Status: DISCONTINUED | OUTPATIENT
Start: 2022-07-28 | End: 2022-07-29

## 2022-07-28 RX ORDER — DOCUSATE SODIUM 100 MG
1 CAPSULE ORAL
Qty: 14 | Refills: 0
Start: 2022-07-28 | End: 2022-08-03

## 2022-07-28 RX ADMIN — HEPARIN SODIUM 5000 UNIT(S): 5000 INJECTION INTRAVENOUS; SUBCUTANEOUS at 19:07

## 2022-07-28 RX ADMIN — ATORVASTATIN CALCIUM 40 MILLIGRAM(S): 80 TABLET, FILM COATED ORAL at 22:32

## 2022-07-28 RX ADMIN — SENNA PLUS 2 TABLET(S): 8.6 TABLET ORAL at 22:32

## 2022-07-28 RX ADMIN — Medication 650 MILLIGRAM(S): at 23:32

## 2022-07-28 RX ADMIN — Medication 81 MILLIGRAM(S): at 07:10

## 2022-07-28 RX ADMIN — Medication 650 MILLIGRAM(S): at 22:32

## 2022-07-28 NOTE — ASU DISCHARGE PLAN (ADULT/PEDIATRIC) - ASU DC SPECIAL INSTRUCTIONSFT
Follow up with Dr. Barclay in 1-2 weeks. Call the office at the number below to schedule your appointment. You may shower; soap and water over incision sites. Do not scrub. Pat dry when done. No tub bathing or swimming until cleared. Keep incision sites out of the sun as scars will darken. Ambulate as tolerated, but no heavy lifting (>10lbs) or strenuous exercise. You may resume regular diet. You should be urinating at least 3-4x per day. Call the office if you experience increasing abdominal pain, nausea, vomiting, or temperature >101 F.    Take 2 tabs tylenol every 6 hours as needed for pain management. You have also been prescribed percocet for pain not controlled by tylenol. Take 2 tabs as prescribed instead of tylenol for severe pain. Take prescribed stool softener (colace) to prevent constipation caused by percocet.

## 2022-07-28 NOTE — ASU DISCHARGE PLAN (ADULT/PEDIATRIC) - NPI NUMBER (FOR SYSADMIN USE ONLY) :
Problem: Self Care Deficits Care Plan (Adult) Goal: *Acute Goals and Plan of Care (Insert Text) Description FUNCTIONAL STATUS PRIOR TO ADMISSION: Patient was modified independent using a single point cane for functional mobility. HOME SUPPORT: The patient lived alone with family to provide assistance. Occupational Therapy Goals Initiated 11/24/2019 1. Patient will perform grooming with supervision/set-up within 7 day(s). 2.  Patient will perform bathing with moderate assistance  within 7 day(s). 3.  Patient will perform lower body dressing with moderate assistance  within 7 day(s). 4.  Patient will perform toilet transfers with moderate assistance  within 7 day(s). 5.  Patient will perform all aspects of toileting with moderate assistance  within 7 day(s). 6.  Patient will participate in upper extremity therapeutic exercise/activities with supervision/set-up for 10 minutes within 7 day(s). 7.  Patient will utilize energy conservation techniques during functional activities with verbal cues within 7 day(s). Outcome: Progressing Towards Goal 
 OCCUPATIONAL THERAPY TREATMENT Patient: Jayden Anderson (48 y.o. female) Date: 11/26/2019 Diagnosis: Fall from ground level Don Cools Left hip pain [M25.552] Fall from ground level Don Cools Left hip pain Precautions: Fall, Skin Chart, occupational therapy assessment, plan of care, and goals were reviewed. ASSESSMENT Patient continues with skilled OT services and is slowly progressing towards goals. Pts son present for tx and encouraging. After pt seated edge of bed she combed her hair and brushed her teeth, simple grooming  with contact guard assist having pain L knee and back. .  O2 sats decreased to 82% on room air with activity, returned pt to 2 L and sats increased to 90%. Pt stood with max assist x 2 in prep for self care tasks but pt resisting the movement, pushing posterior. Pt stood for 45 seconds, than sat on bed before reaching chair, not safe to transfer to chair or BSC. Today placed bed in chair position and pt completed 10 reps of rowing, chest presses. Pt encouraged  to engage with further UE exercises including elbow flex/ext, shoulder flex/ext to increase strength and endurance for functional tasks 3 x a day. Current Level of Function Impacting Discharge (ADLs): Max assist LB ADl's Other factors to consider for discharge: Not safe to transfer OOB for toileting PLAN : 
Patient continues to benefit from skilled intervention to address the above impairments. Continue treatment per established plan of care. to address goals. Recommend with staff: Bed in chair position for functional tasks Recommend next OT session: Cont towards goals Recommendation for discharge: (in order for the patient to meet his/her long term goals) Therapy up to 5 days/week in SNF setting This discharge recommendation: 
Has not yet been discussed the attending provider and/or case management IF patient discharges home will need the following DME: none SUBJECTIVE:  
Patient stated \"I want to go home.  OBJECTIVE DATA SUMMARY:  
Cognitive/Behavioral Status: 
Neurologic State: Alert Orientation Level: Oriented X4 Cognition: Impaired decision making Functional Mobility and Transfers for ADLs: 
Bed Mobility: 
Rolling: Maximum assistance Supine to Sit: Maximum assistance;Assist x2 
 
 Transfers: 
  
 Max assist x 2 sit to stand, not safe to transfer OOB for toileting Balance: 
Standing: Impaired ADL Intervention: 
  
 
Grooming Grooming Assistance: Contact guard assistance(seated edge of bed) Brushing Teeth: Contact guard assistance Brushing/Combing Hair: Contact guard assistance Therapeutic Exercises:  
Encouraged pt to engage with rowing exercise as well as simple UE exercises to increase strength and endurance for functional tasks. Activity Tolerance:  
Fair Please refer to the flowsheet for vital signs taken during this treatment. After treatment patient left in no apparent distress:  
Supine in bed COMMUNICATION/COLLABORATION:  
The patients plan of care was discussed with: Occupational Therapist, Physical Therapy, Registered Nurse Ike Curling Hite, COTA/L Time Calculation: 42 mins [[id number]]

## 2022-07-28 NOTE — BRIEF OPERATIVE NOTE - OPERATION/FINDINGS
Supraumbilical cut down approach. 12 mm robotic port inserted. 2- 8 mm robotic ports inserted under direct visualization. Left inguinal hernia identified. No right sided inguinal hernia noted. ProGrip self fixating mesh used. Peritoneum closed using 2-0 Quill suture.

## 2022-07-28 NOTE — BRIEF OPERATIVE NOTE - NSICDXBRIEFPROCEDURE_GEN_ALL_CORE_FT
PROCEDURES:  Robot-assisted repair of left inguinal hernia using da Papa Xi 28-Jul-2022 11:45:55  Niurka Estes

## 2022-07-28 NOTE — ASU DISCHARGE PLAN (ADULT/PEDIATRIC) - CARE PROVIDER_API CALL
Simin Barclay (MD)  Surgery  [address] [place] [address], [address]  [place] [address]  Phone: [phone]  Established Patient  Follow Up Time: 2 weeks

## 2022-07-29 ENCOUNTER — TRANSCRIPTION ENCOUNTER (OUTPATIENT)
Age: 47
End: 2022-07-29

## 2022-07-29 VITALS
OXYGEN SATURATION: 98 % | TEMPERATURE: 98 F | HEART RATE: 55 BPM | DIASTOLIC BLOOD PRESSURE: 67 MMHG | SYSTOLIC BLOOD PRESSURE: 114 MMHG | RESPIRATION RATE: 18 BRPM

## 2022-07-29 LAB
ANION GAP SERPL CALC-SCNC: 9 MMOL/L — SIGNIFICANT CHANGE UP (ref 5–17)
BUN SERPL-MCNC: 15 MG/DL — SIGNIFICANT CHANGE UP (ref 7–23)
CALCIUM SERPL-MCNC: 9.4 MG/DL — SIGNIFICANT CHANGE UP (ref 8.4–10.5)
CHLORIDE SERPL-SCNC: 102 MMOL/L — SIGNIFICANT CHANGE UP (ref 96–108)
CO2 SERPL-SCNC: 28 MMOL/L — SIGNIFICANT CHANGE UP (ref 22–31)
CREAT SERPL-MCNC: 1.08 MG/DL — SIGNIFICANT CHANGE UP (ref 0.5–1.3)
EGFR: 85 ML/MIN/1.73M2 — SIGNIFICANT CHANGE UP
GLUCOSE SERPL-MCNC: 134 MG/DL — HIGH (ref 70–99)
HCT VFR BLD CALC: 41 % — SIGNIFICANT CHANGE UP (ref 39–50)
HGB BLD-MCNC: 14 G/DL — SIGNIFICANT CHANGE UP (ref 13–17)
MAGNESIUM SERPL-MCNC: 2.5 MG/DL — SIGNIFICANT CHANGE UP (ref 1.6–2.6)
MCHC RBC-ENTMCNC: 30.2 PG — SIGNIFICANT CHANGE UP (ref 27–34)
MCHC RBC-ENTMCNC: 34.1 GM/DL — SIGNIFICANT CHANGE UP (ref 32–36)
MCV RBC AUTO: 88.4 FL — SIGNIFICANT CHANGE UP (ref 80–100)
NRBC # BLD: 0 /100 WBCS — SIGNIFICANT CHANGE UP (ref 0–0)
PHOSPHATE SERPL-MCNC: 2.9 MG/DL — SIGNIFICANT CHANGE UP (ref 2.5–4.5)
PLATELET # BLD AUTO: 182 K/UL — SIGNIFICANT CHANGE UP (ref 150–400)
POTASSIUM SERPL-MCNC: 4.7 MMOL/L — SIGNIFICANT CHANGE UP (ref 3.5–5.3)
POTASSIUM SERPL-SCNC: 4.7 MMOL/L — SIGNIFICANT CHANGE UP (ref 3.5–5.3)
RBC # BLD: 4.64 M/UL — SIGNIFICANT CHANGE UP (ref 4.2–5.8)
RBC # FLD: 11.9 % — SIGNIFICANT CHANGE UP (ref 10.3–14.5)
SODIUM SERPL-SCNC: 139 MMOL/L — SIGNIFICANT CHANGE UP (ref 135–145)
WBC # BLD: 11.4 K/UL — HIGH (ref 3.8–10.5)
WBC # FLD AUTO: 11.4 K/UL — HIGH (ref 3.8–10.5)

## 2022-07-29 PROCEDURE — C1781: CPT

## 2022-07-29 PROCEDURE — S2900: CPT

## 2022-07-29 PROCEDURE — 36415 COLL VENOUS BLD VENIPUNCTURE: CPT

## 2022-07-29 PROCEDURE — 88302 TISSUE EXAM BY PATHOLOGIST: CPT

## 2022-07-29 PROCEDURE — 84100 ASSAY OF PHOSPHORUS: CPT

## 2022-07-29 PROCEDURE — 85027 COMPLETE CBC AUTOMATED: CPT

## 2022-07-29 PROCEDURE — 80048 BASIC METABOLIC PNL TOTAL CA: CPT

## 2022-07-29 PROCEDURE — 83735 ASSAY OF MAGNESIUM: CPT

## 2022-07-29 RX ADMIN — Medication 81 MILLIGRAM(S): at 13:04

## 2022-07-29 RX ADMIN — Medication 650 MILLIGRAM(S): at 06:46

## 2022-07-29 RX ADMIN — Medication 50 MILLIGRAM(S): at 06:25

## 2022-07-29 RX ADMIN — HEPARIN SODIUM 5000 UNIT(S): 5000 INJECTION INTRAVENOUS; SUBCUTANEOUS at 05:46

## 2022-07-29 RX ADMIN — Medication 650 MILLIGRAM(S): at 05:46

## 2022-07-29 RX ADMIN — Medication 650 MILLIGRAM(S): at 13:04

## 2022-07-29 RX ADMIN — HEPARIN SODIUM 5000 UNIT(S): 5000 INJECTION INTRAVENOUS; SUBCUTANEOUS at 13:04

## 2022-07-29 NOTE — DISCHARGE NOTE NURSING/CASE MANAGEMENT/SOCIAL WORK - PATIENT PORTAL LINK FT
You can access the FollowMyHealth Patient Portal offered by [facility] by registering at the following website: [email or web address]. By joining [employer]’s FollowMyHealth portal, you will also be able to view your health information using other applications (apps) compatible with our system.

## 2022-07-29 NOTE — DISCHARGE NOTE NURSING/CASE MANAGEMENT/SOCIAL WORK - NSDCPEFALRISK_GEN_ALL_CORE
For information on Fall & Injury Prevention, visit: [email or web address] OR  [email or web address] OR  https://www.cdc.gov/steadi/patient.html

## 2022-07-29 NOTE — PROVIDER CONTACT NOTE (OTHER) - ACTION/TREATMENT ORDERED:
Notified MD Sandeep Funes. will continue to monitor Notified MD Jeff Funes. will continue to monitor

## 2022-07-29 NOTE — PROGRESS NOTE ADULT - ASSESSMENT
47M with pmh of STEMI s/p 3V CABG (2019) and JESSICA (1/22, only on ASA) and left inguinal hernia, possible right, now s/p RA laparoscopic left inguinal hernia repair w/ mesh (7/28). Post op check within normal limits.     Admit to regional under Dr. Barclay  Regular DASH/IVF 40  Analgesics PRN  Antiemetics PRN  C/w home ASA, norvasc, metoprolol, and atorvastatin 7/29  IS/OOBA  VTE PPX with SCDs and SQH  
47M with pmh of STEMI s/p 3V CABG (2019) and JESSICA (1/22, only on ASA) and left inguinal hernia, possible right, now s/p RA laparoscopic left inguinal hernia repair w/ mesh (7/28). Urinating without issues today, AFVSS.     Regular DASH  Analgesics PRN  Antiemetics PRN  IS/OOBA  VTE PPX with SCDs and SQH  Dispo: Home

## 2022-07-29 NOTE — DISCHARGE NOTE NURSING/CASE MANAGEMENT/SOCIAL WORK - DATE OF LAST VACCINATION
Dr. Watters aware of below and was updated on ortho message 2 hours prior.    ePDMP on desk for review.  Currently Hydrocodone not on medication list.  See encounter from when patient went downstairs looking for pain meds, patient was then going to go to ER.     26-May-2021

## 2022-07-29 NOTE — PROGRESS NOTE ADULT - SUBJECTIVE AND OBJECTIVE BOX
STATUS POST:  robotic left inguinal hernia repair 7/29     SUBJECTIVE: Patient seen and examined bedside by chief resident. Patient reports having some difficulty urinating last night but hasn't had issues with urination today. Tolerating regular diet. Denies cp/sob/palpitations/n/v/dizziness/weakness. Passing flatus, no bowel movements yet.    aspirin  chewable 81 milliGRAM(s) Oral daily  heparin   Injectable 5000 Unit(s) SubCutaneous every 8 hours  metoprolol succinate ER 50 milliGRAM(s) Oral daily      Vital Signs Last 24 Hrs  T(C): 36.7 (29 Jul 2022 05:30), Max: 36.7 (28 Jul 2022 14:34)  T(F): 98.1 (29 Jul 2022 05:30), Max: 98.1 (29 Jul 2022 05:30)  HR: 56 (29 Jul 2022 08:28) (46 - 66)  BP: 133/86 (29 Jul 2022 08:28) (111/64 - 133/86)  BP(mean): --  RR: 17 (29 Jul 2022 08:28) (17 - 18)  SpO2: 99% (29 Jul 2022 08:28) (94% - 99%)    Parameters below as of 29 Jul 2022 08:28  Patient On (Oxygen Delivery Method): room air      I&O's Detail    28 Jul 2022 07:01  -  29 Jul 2022 07:00  --------------------------------------------------------  IN:    Lactated Ringers: 1350 mL    Oral Fluid: 75 mL  Total IN: 1425 mL    OUT:    Blood Loss (mL): 5 mL    Voided (mL): 2575 mL  Total OUT: 2580 mL    Total NET: -1155 mL      29 Jul 2022 07:01  -  29 Jul 2022 14:19  --------------------------------------------------------  IN:    Lactated Ringers: 160 mL    Oral Fluid: 250 mL  Total IN: 410 mL    OUT:  Total OUT: 0 mL    Total NET: 410 mL          General: NAD, resting comfortably in bed  C/V: NSR  Pulm: Nonlabored breathing, no respiratory distress  Abd: soft, appropriately tender, slightly distended. Incisions c/d/i.  Extrem: WWP, no edema, SCDs in place        LABS:                        14.0   11.40 )-----------( 182      ( 29 Jul 2022 11:00 )             41.0     07-29    139  |  102  |  15  ----------------------------<  134<H>  4.7   |  28  |  1.08    Ca    9.4      29 Jul 2022 11:00  Phos  2.9     07-29  Mg     2.5     07-29            RADIOLOGY & ADDITIONAL STUDIES:  
STATUS POST:  robotic left inguinal hernia repair with mesh     SUBJECTIVE: Patient seen and examined bedside by surgery team. Patient reports pain is well controlled. Denies cp/sob/n/v.    heparin   Injectable 5000 Unit(s) SubCutaneous every 8 hours      Vital Signs Last 24 Hrs  T(C): 36.7 (28 Jul 2022 14:34), Max: 36.8 (28 Jul 2022 11:57)  T(F): 98 (28 Jul 2022 14:34), Max: 98.3 (28 Jul 2022 11:57)  HR: 53 (28 Jul 2022 14:34) (53 - 87)  BP: 111/64 (28 Jul 2022 14:34) (107/69 - 116/75)  BP(mean): 92 (28 Jul 2022 13:12) (81 - 92)  RR: 17 (28 Jul 2022 14:34) (16 - 21)  SpO2: 94% (28 Jul 2022 14:34) (94% - 100%)    Parameters below as of 28 Jul 2022 14:34  Patient On (Oxygen Delivery Method): room air      I&O's Detail    28 Jul 2022 07:01  -  28 Jul 2022 15:51  --------------------------------------------------------  IN:    Lactated Ringers: 870 mL    Oral Fluid: 75 mL  Total IN: 945 mL    OUT:    Blood Loss (mL): 5 mL    Voided (mL): 250 mL  Total OUT: 255 mL    Total NET: 690 mL          General: NAD, resting comfortably in bed  C/V: NSR  Pulm: Nonlabored breathing, no respiratory distress  Abd: soft, appropriately tender, ND. Laparoscopic incisions c/d/i  Extrem: WWP, no edema, SCDs in place        LABS:                RADIOLOGY & ADDITIONAL STUDIES:

## 2022-08-01 DIAGNOSIS — K40.90 UNILATERAL INGUINAL HERNIA, WITHOUT OBSTRUCTION OR GANGRENE, NOT SPECIFIED AS RECURRENT: ICD-10-CM

## 2022-08-01 DIAGNOSIS — I25.10 ATHEROSCLEROTIC HEART DISEASE OF NATIVE CORONARY ARTERY WITHOUT ANGINA PECTORIS: ICD-10-CM

## 2022-08-01 DIAGNOSIS — I10 ESSENTIAL (PRIMARY) HYPERTENSION: ICD-10-CM

## 2022-08-08 LAB — SURGICAL PATHOLOGY STUDY: SIGNIFICANT CHANGE UP

## 2022-11-23 NOTE — ED PROVIDER NOTE - PATIENT PORTAL LINK FT
Anesthesia Post-op Note      Patient: Lenoor Menard  ANESTHESIA:  * No anesMACthesia type entered *   ANESTHESIOLOGIST:  Anesthesiologist: Kamaljit Eng MD  Procedure(s) Performed: EP STUDY; DRUG STUDY/CHALLENGE; MAPPING INTRACARDIAC STANDARD; ABLATION AVN MODIFICATION    Vital Last Value   Temperature  36.2   Pulse 74 (11/22/22 1600)   Respiratory 18 (11/22/22 1600)   Non-Invasive  Blood Pressure 134/62 (11/22/22 1600)   Arterial   Blood Pressure     Pulse Oximetry 98 % (11/22/22 1600)       Post-op vital signs: stable.  Level of Consciousness: participates in exam, answers questions appropriately, awake and oriented.  Respiratory: unassisted, spontaneous ventilation  Cardiovascular: stable and blood pressure at baseline  Hydration: euvolemic  Post-op pain: adequately controlled   Nausea: None  Airway patency: patent  Post-op assessment: No apparent anesthetic complications.  Complications: None.    Comments:     
You can access the FollowMyHealth Patient Portal offered by [facility] by registering at the following website: [email or web address]. By joining [employer]’s FollowMyHealth portal, you will also be able to view your health information using other applications (apps) compatible with our system.

## 2023-08-30 NOTE — ED ADULT NURSE NOTE - NEURO WDL
Alert and oriented to person, place and time, memory intact, behavior appropriate to situation
3 = A little assistance

## 2023-11-09 NOTE — PATIENT PROFILE ADULT - CENTRAL VENOUS CATHETER/PICC LINE
"Recommendations from today's MTM visit:                                                    MTM (medication therapy management) is a service provided by a clinical pharmacist designed to help you get the most of out of your medicines.   Today we reviewed what your medicines are for, how to know if they are working, that your medicines are safe and how to make your medicine regimen as easy as possible.      Repeat your A1c today after our visit  I will send a message to Dr. Posada about a refill of your Ambien    Follow-up: We will discuss follow up depending on your A1c.    It was great speaking with you today.  I value your experience and would be very thankful for your time in providing feedback in our clinic survey. In the next few days, you may receive an email or text message from [employer] with a link to a survey related to your  clinical pharmacist.\"     To schedule another MTM appointment, please call the clinic directly or you may call the MTM scheduling line at [phone] or toll-free at [phone].     My Clinical Pharmacist's contact information:                                                      Please feel free to contact me with any questions or concerns you have.      Belkis Hadley, PharmD  Medication Therapy Management Pharmacist  Voicemail: [phone]    "
no
Unknown
red

## 2024-08-15 NOTE — ED PROVIDER NOTE - NS_BEDUNITTYPES_ED_ALL_ED
What Is The Reason For Today's Visit?: History of Non-Melanoma Skin Cancer How Many Skin Cancers Have You Had?: one ICU

## 2024-12-01 NOTE — PROGRESS NOTE ADULT - PROBLEM SELECTOR PLAN 1
Discharge instructions, home medications, and follow up appointments given and reviewed with patient and significant other. Opportunity for questions provided. Patient and significant other verbalized understanding of discharge teaching.    Patient presented with chest pain found to have inferior wall MI. Now s/p JESSICA to mid-RCA. 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  - CABG scheduled as first case on Tuesday (patient aware)  - Pre-op preparation: PFTs complete (results in paper chart), Carotid doppler (7/27) showing mild atherosclerotic disease, T&S/coags for Monday AM  - Continue aspirin 81 mg PO daily  - Continue Plavix 75 mg PO daily  - Continue lipitor 80 mg PO at bedtime  - Continue lopressor 12.5 mg PO BID  - Heparin ggt 10 mL/hr  - f/u PTT qd  - Close watch, should patient have chest pain, low threshold for balloon pump  - After CABG can restart lisinopril should BPs allow  - Echo showed EF of 45-50%, Mild segmental left ventricular systolic dysfunction with akinesis of basal inferior and hypokinesis of the inferolateral wall.  - Lipid panel (Cholesterol 264, , HDL 43, TGs 110), TSH (1.197) and HbA1c (5.6).  - Troponin peaked at 8.22 > 7.55, no need to trend Patient presented with chest pain found to have inferior wall MI. Now s/p JESSICA to mid-RCA. 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  - CABG scheduled as first case on Tuesday (patient aware), NPO at midnight  - Pre-op preparation: PFTs complete (results in paper chart), Carotid doppler (7/27) showing mild atherosclerotic disease, T&S/coags for Monday AM  - Continue aspirin 81 mg PO daily  - Continue Plavix 75 mg PO daily  - Continue lipitor 80 mg PO at bedtime  - Continue lopressor 12.5 mg PO BID  - Heparin ggt 10 mL/hr  - f/u PTT qd  - Close watch, should patient have chest pain, low threshold for balloon pump  - After CABG can restart lisinopril should BPs allow  - Echo showed EF of 45-50%, Mild segmental left ventricular systolic dysfunction with akinesis of basal inferior and hypokinesis of the inferolateral wall.  - Lipid panel (Cholesterol 264, , HDL 43, TGs 110), TSH (1.197) and HbA1c (5.6).  - Troponin peaked at 8.22 > 7.55, no need to trend

## 2025-01-16 NOTE — PROGRESS NOTE ADULT - PROBLEM SELECTOR PLAN 2
Copied from CRM #[id number]. Topic: MW Paging/Messaging - MW Paging Healthcare Rep/Clinician Request  >> Jan 16, 2025 10:42 AM Svitlana STOKES wrote:  [employer] with provider/healthcare rep to provider call:    Regarding CHYNA MERCADO during Working Hrs;  Selected 'Wrap Up CRM' and created new Telephone Encounter after clicking 'Convert to Clinical Call'.  Sent Pt Message template, selected Reason for Call Other and input in the specific reason in the comments.     Message Not Urgent: Routed as routine priority per Clinician KB page to appropriate clinician pool. Readback full message.    -- DO NOT REPLY / DO NOT REPLY ALL --  -- This inbox is not monitored. If this was sent to the wrong provider or department, reroute message to P ECO Reroute pool. --  -- Message is from Engagement Center Operations (ECO) --    General Patient Message:  is  calling in from the pcp office and she states the office put an referral in the system yesterday and she would like to have someone from the clinical staff give a call back tos schedule the appt      Caller Information       Contact Date/Time Type Contact Phone/Fax    01/16/2025 10:39 AM CST Phone (Incoming) [employer] [phone]                 Alternative phone number:     Can a detailed message be left? Yes - Voicemail   Patient has been advised the message will be addressed within 2-3 business days.    {Please give the turnaround time to the caller -  \"This message will be sent to [state Provider's first and last name].  You can expect to receive a response within 2-3 business days from your provider's clinical team. Please be aware the return phone call may come from an unidentified or out of state phone number.\"            
F: No IVF  E: Replete K<4 and Mag<2  N: DASH/TLC diet

## 2025-02-04 NOTE — DIETITIAN INITIAL EVALUATION ADULT. - PROBLEM SELECTOR PLAN 1
CAN YOU PLEASE LOOK AT HIS X RAY AND LET ME KNOW HOW SOON HE NEEDS TO BE SEEN  
Patient presented with chest pain found to have inferior wall MI. Now s/p JESSICA to mid-RCA. 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 Plan for CABG on monday.   - Pt is s/p aspirin and Brilinta load at 9:45 AM, instead of Brilinta will switch over to Plavix as he is going for CABG.   - Continue aspirin 81mg daily starting tomorrow   - Continue Plavix 75mg qd starting tomorrow  - Continue lipitor 80mg at bedtime  - Continue lopressor 12.5 BID  - Consider lisinopril should BPs allow  - Will start heparin ggt after radial band removed  - follow up Lipid panel, TSH and HbA1c  - Official Echocardiogram pending  - Continue to trend troponins

## 2025-03-29 NOTE — ASU DISCHARGE PLAN (ADULT/PEDIATRIC) - CLICK TO LAUNCH ORM
"A&Ox4. VSS on room air. CMS intact. Denies nausea. Mild headache. CIWAs 4 and 1 this shift.  Pain managed with scheduled tylenol, robaxin, and gabapentin as well as prn atarax and oxycodone. Scheduled meds adjusted due to lightheadedness during PT. Tolerating regular diet. Blood sugars 137 and 188 this shift. Up ax1 gb/walker. Aspen collar on at all times. Discharge pending therapy.    Goal Outcome Evaluation:      Plan of Care Reviewed With: patient    Overall Patient Progress: improvingOverall Patient Progress: improving       Problem: Adult Inpatient Plan of Care  Goal: Plan of Care Review  Description: The Plan of Care Review/Shift note should be completed every shift.  The Outcome Evaluation is a brief statement about your assessment that the patient is improving, declining, or no change.  This information will be displayed automatically on your shiftnote.  Outcome: Progressing  Flowsheets (Taken 3/29/2025 1104)  Plan of Care Reviewed With: patient  Overall Patient Progress: improving  Goal: Patient-Specific Goal (Individualized)  Description: You can add care plan individualizations to a care plan. Examples of Individualization might be:  \"Parent requests to be called daily at 9am for status\", \"I have a hard time hearing out of my right ear\", or \"Do not touch me to wake me up as it startlesme\".  Outcome: Progressing  Goal: Absence of Hospital-Acquired Illness or Injury  Outcome: Progressing  Intervention: Identify and Manage Fall Risk  Recent Flowsheet Documentation  Taken 3/29/2025 0744 by Kayley Hernandez RN  Safety Promotion/Fall Prevention: safety round/check completed  Intervention: Prevent Skin Injury  Recent Flowsheet Documentation  Taken 3/29/2025 0744 by Kayley Hernandez RN  Skin Protection: adhesive use limited  Intervention: Prevent and Manage VTE (Venous Thromboembolism) Risk  Recent Flowsheet Documentation  Taken 3/29/2025 0744 by Kayley Hernandez RN  VTE Prevention/Management: SCDs off " (sequential compression devices)  Intervention: Prevent Infection  Recent Flowsheet Documentation  Taken 3/29/2025 0744 by Kayley Hernandez RN  Infection Prevention:   single patient room provided   rest/sleep promoted  Goal: Optimal Comfort and Wellbeing  Outcome: Progressing  Intervention: Monitor Pain and Promote Comfort  Recent Flowsheet Documentation  Taken 3/29/2025 0733 by Kayley Hernandez RN  Pain Management Interventions: medication (see MAR)  Goal: Readiness for Transition of Care  Outcome: Progressing     Problem: Pain Acute  Goal: Optimal Pain Control and Function  Outcome: Progressing  Intervention: Optimize Psychosocial Wellbeing  Recent Flowsheet Documentation  Taken 3/29/2025 0744 by Kayley Hernandez RN  Supportive Measures: active listening utilized  Intervention: Develop Pain Management Plan  Recent Flowsheet Documentation  Taken 3/29/2025 0733 by Kayley Hernandez RN  Pain Management Interventions: medication (see MAR)  Intervention: Prevent or Manage Pain  Recent Flowsheet Documentation  Taken 3/29/2025 0744 by Kayley Hernandez RN  Sensory Stimulation Regulation:   care clustered   lighting decreased  Bowel Elimination Promotion:   adequate fluid intake promoted   ambulation promoted  Medication Review/Management: medications reviewed     Problem: Activity Intolerance  Goal: Enhanced Capacity and Energy  Outcome: Progressing  Intervention: Optimize Activity Tolerance  Recent Flowsheet Documentation  Taken 3/29/2025 0744 by Kayley Hernandez RN  Environmental Support:   calm environment promoted   rest periods encouraged     Problem: Orthopaedic Fracture  Goal: Absence of Bleeding  Outcome: Progressing  Intervention: Monitor and Manage Fracture Bleeding  Recent Flowsheet Documentation  Taken 3/29/2025 0744 by Kayley Hernandez RN  Fracture Immobilization: supported during position changes  Goal: Bowel Elimination  Outcome: Progressing  Intervention: Promote Effective Bowel Elimination  Recent  Flowsheet Documentation  Taken 3/29/2025 0744 by Kayley Hernandez RN  Bowel Elimination Promotion:   adequate fluid intake promoted   ambulation promoted  Goal: Absence of Embolism Signs and Symptoms  Outcome: Progressing  Intervention: Prevent or Manage Embolism Risk  Recent Flowsheet Documentation  Taken 3/29/2025 0744 by Kayley Hernandez RN  VTE Prevention/Management: SCDs off (sequential compression devices)  Goal: Fracture Stability  Outcome: Progressing  Intervention: Promote Fracture Stability and Healing  Recent Flowsheet Documentation  Taken 3/29/2025 0744 by Kayley Hernandez RN  Fracture Immobilization: supported during position changes  Goal: Optimal Functional Ability  Outcome: Progressing  Intervention: Optimize Functional Ability  Recent Flowsheet Documentation  Taken 3/29/2025 0744 by Kayley Hernandez RN  Positioning/Transfer Devices:   pillows   in use  Goal: Absence of Infection Signs and Symptoms  Outcome: Progressing  Goal: Effective Tissue Perfusion  Outcome: Progressing  Intervention: Prevent or Manage Neurovascular Compromise  Recent Flowsheet Documentation  Taken 3/29/2025 0744 by Kayley Hernandez RN  Compartment Syndrome Management: active flexion/extension encouraged  Goal: Optimal Pain Control and Function  Outcome: Progressing  Intervention: Manage Acute Orthopaedic-Related Pain  Recent Flowsheet Documentation  Taken 3/29/2025 0733 by Kayley Hernandez RN  Pain Management Interventions: medication (see MAR)  Goal: Effective Oxygenation and Ventilation  Outcome: Progressing  Intervention: Promote Airway Secretion Clearance  Recent Flowsheet Documentation  Taken 3/29/2025 0744 by Kayley Hernandez RN  Cough And Deep Breathing: done independently per patient     Problem: Alcohol Withdrawal  Goal: Alcohol Withdrawal Symptom Control  Outcome: Progressing  Intervention: Minimize or Manage Alcohol Withdrawal Symptoms  Recent Flowsheet Documentation  Taken 3/29/2025 0744 by Kayley Hernandez  RN  Sensory Stimulation Regulation:   care clustered   lighting decreased  Goal: Optimal Neurologic Function  Outcome: Progressing  Intervention: Minimize or Manage Acute Neurologic Symptoms  Recent Flowsheet Documentation  Taken 3/29/2025 0744 by Kayley Hernandez RN  Sensory Stimulation Regulation:   care clustered   lighting decreased  Goal: Readiness for Change Identified  Outcome: Progressing  Intervention: Partner to Facilitate Behavior Change  Recent Flowsheet Documentation  Taken 3/29/2025 0744 by Kayley Hernandez RN  Supportive Measures: active listening utilized     Problem: Comorbidity Management  Goal: Blood Glucose Levels Within Targeted Range  Outcome: Progressing  Intervention: Monitor and Manage Glycemia  Recent Flowsheet Documentation  Taken 3/29/2025 0744 by Kayley Hernandez RN  Medication Review/Management: medications reviewed  Goal: Blood Pressure in Desired Range  Outcome: Progressing  Intervention: Maintain Blood Pressure Management  Recent Flowsheet Documentation  Taken 3/29/2025 0744 by Kayley Hernandez RN  Medication Review/Management: medications reviewed     Problem: Dysrhythmia  Goal: Normalized Cardiac Rhythm  Outcome: Progressing  Intervention: Monitor and Manage Cardiac Rhythm Effect  Recent Flowsheet Documentation  Taken 3/29/2025 0744 by Kayley Hernandez RN  VTE Prevention/Management: SCDs off (sequential compression devices)          .

## 2025-04-08 NOTE — CONSULT NOTE ADULT - PROVIDER SPECIALTY LIST ADULT
"Voicemail received from patient \"Good morning, Barbara. This is Cristy Rumble calling. If it's possible for you to give me a call, I would appreciate it. I just need to have a little bit of help with some scheduling so you can reach me at [phone]. [phone] again, [phone]. Thanks Barbara. Look forward to talking to you. Bye bye. \"    " --------------- APPROVED REPORT --------------





EKG Measurement

Heart [id number]

[id number]

[id number]

[id number]

[id number]



<Conclusion>

Normal sinus rhythm

Normal ECG CT Surgery

## 2025-04-24 NOTE — PROGRESS NOTE ADULT - PROBLEM SELECTOR PROBLEM 3
Patient notified of recent urine results. Patient was notified to continue taking current antibiotics until otherwise indicated.     ----- Message from Chrissy Solorzano PA-C sent at 4/24/2025 12:11 PM CDT -----  Complete urinalysis shows evidence of UTI. Continue levaquin while urine culture is pending.  
Need for prophylactic measure

## (undated) DEVICE — SUT MAXON 0 30" GS-11

## (undated) DEVICE — PACK GENERAL LAPAROSCOPY

## (undated) DEVICE — XI OBTURATOR OPTICAL BLADELESS 8MM

## (undated) DEVICE — XI DRAPE ARM

## (undated) DEVICE — ELCTR GROUNDING PAD ADULT COVIDIEN

## (undated) DEVICE — INSUFFLATION NDL COVIDIEN SURGINEEDLE VERESS 120MM

## (undated) DEVICE — SYR ASEPTO

## (undated) DEVICE — DRSG SUPPORTER ADULT 3" WAISTBAND MED

## (undated) DEVICE — ELCTR BOVIE PENCIL HANDPIECE ROCKER SWITCH 15FT

## (undated) DEVICE — DRSG DERMABOND 0.7ML

## (undated) DEVICE — XI ARM FORCEP FENESTRATED BIPOLAR 8MM

## (undated) DEVICE — XI TIP COVER

## (undated) DEVICE — SUT VICRYL 0 27" UR-6

## (undated) DEVICE — TUBING STRYKER PNEUMOCLEAR HIGH FLOW

## (undated) DEVICE — SLV COMPRESSION KNEE MED

## (undated) DEVICE — SUT VLOC 180 2-0 9" GS-22 GREEN

## (undated) DEVICE — WARMING BLANKET LOWER ADULT

## (undated) DEVICE — D HELP - CLEARVIEW CLEARIFY SYSTEM

## (undated) DEVICE — DRAPE TOP SHEET 53" X 101"

## (undated) DEVICE — DRAPE 3/4 SHEET 52X76"

## (undated) DEVICE — SUT VICRYL 3-0 27" SH UNDYED

## (undated) DEVICE — MARKING PEN W RULER

## (undated) DEVICE — GLV 6.5 PROTEXIS (WHITE)

## (undated) DEVICE — XI DRAPE COLUMN

## (undated) DEVICE — DRSG SUPPORTER ADULT 3" WAISTBAND LRG

## (undated) DEVICE — SUT PDO 0 1/2 CIRCLE 22MM NDL 20CM

## (undated) DEVICE — TROCAR COVIDIEN VERSAONE BLUNT TIP HASSAN 12MM

## (undated) DEVICE — XI SEAL UNIV 5- 8 MM

## (undated) DEVICE — DRAPE IOBAN 23" X 23"

## (undated) DEVICE — SUT MONOCRYL 4-0 27" PS-2 UNDYED

## (undated) DEVICE — GLV 7 PROTEXIS (WHITE)